# Patient Record
Sex: FEMALE | Race: WHITE | Employment: FULL TIME | URBAN - METROPOLITAN AREA
[De-identification: names, ages, dates, MRNs, and addresses within clinical notes are randomized per-mention and may not be internally consistent; named-entity substitution may affect disease eponyms.]

---

## 2017-01-26 PROBLEM — E66.01 MORBID OBESITY WITH BMI OF 40.0-44.9, ADULT (HCC): Status: ACTIVE | Noted: 2017-01-26

## 2017-01-26 PROCEDURE — 81001 URINALYSIS AUTO W/SCOPE: CPT | Performed by: INTERNAL MEDICINE

## 2017-01-26 PROCEDURE — 87086 URINE CULTURE/COLONY COUNT: CPT | Performed by: INTERNAL MEDICINE

## 2017-06-29 PROBLEM — H43.812 PVD (POSTERIOR VITREOUS DETACHMENT), LEFT: Status: ACTIVE | Noted: 2017-06-29

## 2017-06-29 PROBLEM — H35.462: Status: ACTIVE | Noted: 2017-06-29

## 2018-02-01 PROCEDURE — 87086 URINE CULTURE/COLONY COUNT: CPT | Performed by: INTERNAL MEDICINE

## 2018-02-01 PROCEDURE — 81001 URINALYSIS AUTO W/SCOPE: CPT | Performed by: INTERNAL MEDICINE

## 2018-02-04 PROBLEM — R31.29 MICROSCOPIC HEMATURIA: Status: ACTIVE | Noted: 2018-02-04

## 2019-03-14 PROCEDURE — 81001 URINALYSIS AUTO W/SCOPE: CPT | Performed by: INTERNAL MEDICINE

## 2019-03-14 PROCEDURE — 87086 URINE CULTURE/COLONY COUNT: CPT | Performed by: INTERNAL MEDICINE

## 2020-03-04 PROBLEM — H35.462: Status: RESOLVED | Noted: 2017-06-29 | Resolved: 2020-03-04

## 2020-03-04 PROBLEM — E66.01 MORBID OBESITY WITH BMI OF 40.0-44.9, ADULT (HCC): Status: RESOLVED | Noted: 2017-01-26 | Resolved: 2020-03-04

## 2020-03-04 PROBLEM — E66.01 OBESITY, MORBID (HCC): Status: ACTIVE | Noted: 2020-03-04

## 2024-06-05 ENCOUNTER — OFFICE VISIT (OUTPATIENT)
Dept: INTERNAL MEDICINE CLINIC | Facility: CLINIC | Age: 64
End: 2024-06-05
Payer: COMMERCIAL

## 2024-06-05 VITALS
SYSTOLIC BLOOD PRESSURE: 126 MMHG | OXYGEN SATURATION: 98 % | BODY MASS INDEX: 48.11 KG/M2 | DIASTOLIC BLOOD PRESSURE: 86 MMHG | WEIGHT: 281.81 LBS | HEART RATE: 80 BPM | HEIGHT: 64 IN

## 2024-06-05 DIAGNOSIS — E04.1 THYROID NODULE: ICD-10-CM

## 2024-06-05 DIAGNOSIS — E55.9 VITAMIN D DEFICIENCY: ICD-10-CM

## 2024-06-05 DIAGNOSIS — D22.9 MULTIPLE PIGMENTED NEVI: ICD-10-CM

## 2024-06-05 DIAGNOSIS — Z12.31 ENCOUNTER FOR SCREENING MAMMOGRAM FOR MALIGNANT NEOPLASM OF BREAST: ICD-10-CM

## 2024-06-05 DIAGNOSIS — F41.9 ANXIETY: ICD-10-CM

## 2024-06-05 DIAGNOSIS — E66.01 OBESITY, MORBID (HCC): ICD-10-CM

## 2024-06-05 DIAGNOSIS — Z13.6 SCREENING FOR HEART DISEASE: ICD-10-CM

## 2024-06-05 DIAGNOSIS — Z00.00 ANNUAL PHYSICAL EXAM: Primary | ICD-10-CM

## 2024-06-05 DIAGNOSIS — Z12.11 SCREENING FOR COLON CANCER: ICD-10-CM

## 2024-06-05 PROCEDURE — 99386 PREV VISIT NEW AGE 40-64: CPT | Performed by: INTERNAL MEDICINE

## 2024-06-05 RX ORDER — ALPRAZOLAM 0.5 MG/1
0.5 TABLET ORAL 3 TIMES DAILY PRN
Qty: 90 TABLET | Refills: 1 | Status: SHIPPED | OUTPATIENT
Start: 2024-06-05

## 2024-06-05 RX ORDER — PREDNISOLONE ACETATE 10 MG/ML
SUSPENSION/ DROPS OPHTHALMIC
COMMUNITY
Start: 2024-06-03

## 2024-06-05 RX ORDER — OFLOXACIN 3 MG/ML
SOLUTION/ DROPS OPHTHALMIC
COMMUNITY
Start: 2024-06-03

## 2024-06-05 RX ORDER — BROMFENAC SODIUM 0.81 MG/ML
SOLUTION/ DROPS OPHTHALMIC
COMMUNITY
Start: 2024-06-04

## 2024-06-05 NOTE — PROGRESS NOTES
Charmaine Bishop is a 64 year old female.    Chief complaint: annual physical exam       HPI:     Charmaine Bishop is a 64 year old pleasant female who presents for annual physical exam   Weight gain   Gained more weight with the loss of parents   Started gaining more weight in the 50's   High stress job   Now trying to loose weight   She joined weight [facility] in January   No interested in medications           Thyroid nodule   S/p FNA thyroid   Benign         Had the first panic attack 20 years ago   Doesn't take alprazolam every day   Xanax couple of times per month and sometime she can go months without it           Not sure if she snores at night   Doesn't sleep through the night   She gets 8 hours of sleep   Interrupted sleep   Nocturia               History of microscopic hematuria   S/p urological evaluation       No smoking   Alcohol social   Exercise : no she is going to be working on that       Family history of cancer:  Father: lung  cancer heavy smoker   Brother : ENT cancer heavy smoker and drinker         Current Outpatient Medications   Medication Sig Dispense Refill    Bromfenac Sodium 0.07 % Ophthalmic Solution       ofloxacin 0.3 % Ophthalmic Solution       prednisoLONE 1 % Ophthalmic Suspension       ALPRAZolam 0.5 MG Oral Tab Take 1 tablet (0.5 mg total) by mouth 3 (three) times daily as needed for Anxiety. 90 tablet 1    carvedilol 12.5 MG Oral Tab Take 1 tablet (12.5 mg total) by mouth 2 (two) times daily with meals. (Patient not taking: Reported on 6/4/2024) 180 tablet 3    ergocalciferol (DRISDOL) 1.25 MG (39723 UT) Oral Cap 1 CAPSULE EVERY WEEK (Patient not taking: Reported on 9/20/2021) 12 capsule 0      Past Medical History:    ANXIETY    Cataract    Cyst of pineal gland    Hiatal hernia    small; noted on CT urogram    HYPERLIPIDEMIA    Morbid obesity with BMI of 45.0-49.9, adult (HCC)    Posterior vitreous detachment    Screening for osteoporosis    normal    Thyroid nodule    Vitamin D  deficiency     Past Surgical History:   Procedure Laterality Date    Cyst aspiration right  2013    Other surgical history      wisdom teeth    Other surgical history  2012    FNA bx of thyroid nodule (per Dr. Ellis)    Other surgical history  03/19/2018    Cysto- Dr Oh             Family History   Problem Relation Age of Onset    Cancer Father         lung    Diabetes Father     Heart Disorder Father     Other (Other) Father     Cancer Brother         tongue    Other (alcoholic) Brother     Heart Disorder Paternal Grandfather     Eye Problems Neg      Patient Active Problem List   Diagnosis    Anxiety    Vitamin D deficiency    Pure hypercholesterolemia    Thyroid nodule    PVD (posterior vitreous detachment), left    Microscopic hematuria    Obesity, morbid (HCC)       REVIEW OF SYSTEMS:   A comprehensive 10 point review of systems was completed.  Pertinent positives and negatives noted in the the HPI            EXAM:   /86   Pulse 80   Ht 5' 4\" (1.626 m)   Wt 281 lb 12.8 oz (127.8 kg)   SpO2 98%   BMI 48.37 kg/m²   GENERAL: well developed, well nourished,in no apparent distress  SKIN: multiple pigmented nevi   HEENT: atraumatic, normocephalic,ears and throat are clear  NECK: supple,no adenopathy  LUNGS: clear to auscultation  Breast : normal   CARDIO: RRR without murmur  GI: no masses, HSM or tenderness  EXTREMITIES: no cyanosis, clubbing or edema  NEURO: no gross deficits              Orders Placed This Encounter    Bromfenac Sodium 0.07 % Ophthalmic Solution    ofloxacin 0.3 % Ophthalmic Solution    prednisoLONE 1 % Ophthalmic Suspension     No results found.         ASSESSMENT AND PLAN:       ICD-10-CM    1. Annual physical exam  Z00.00 OBG Referral - In Network      THYROID (CPT=76536)     Occult Blood, Fecal, Immunoassay (Blue cards) [E]     CBC With Differential With Platelet     Comp Metabolic Panel (14)     Lipid Panel     Hemoglobin A1C     TSH W Reflex To Free T4     VITAMIN D, SCREEN  [82159][Q]     UA/M WITH CULTURE REFLEX [3020][Q]     ALPRAZolam (XANAX) 0.5 MG Oral Tab     Derm Referral - In Network      2. Encounter for screening mammogram for malignant neoplasm of breast  Z12.31 [facility] IVAN 2D+3D SCREENING BILAT (CPT=77067/89843)     OBG Referral - In Network      THYROID (CPT=76536)     Occult Blood, Fecal, Immunoassay (Blue cards) [E]     CBC With Differential With Platelet     Comp Metabolic Panel (14)     Lipid Panel     Hemoglobin A1C     TSH W Reflex To Free T4     VITAMIN D, SCREEN [55543][Q]     UA/M WITH CULTURE REFLEX [3020][Q]     ALPRAZolam (XANAX) 0.5 MG Oral Tab     Derm Referral - In Network      3. Screening for colon cancer  Z12.11 Occult Blood, Fecal, Immunoassay (Blue cards) [E]     CBC With Differential With Platelet     Comp Metabolic Panel (14)     Lipid Panel     Hemoglobin A1C     TSH W Reflex To Free T4     VITAMIN D, SCREEN [99175][Q]     UA/M WITH CULTURE REFLEX [3020][Q]     ALPRAZolam (XANAX) 0.5 MG Oral Tab     Derm Referral - In Network      4. Vitamin D deficiency  E55.9 CBC With Differential With Platelet     Comp Metabolic Panel (14)     Lipid Panel     Hemoglobin A1C     TSH W Reflex To Free T4     VITAMIN D, SCREEN [22911][Q]     UA/M WITH CULTURE REFLEX [3020][Q]     ALPRAZolam (XANAX) 0.5 MG Oral Tab     Derm Referral - In Network      5. Screening for heart disease  Z13.6 CT CALCIUM SCORING     Derm Referral - In Network      6. Obesity, morbid (HCC)  E66.01       7. Thyroid nodule  E04.1 US THYROID (CPT=76536)      8. Anxiety  F41.9       9. Multiple pigmented nevi  D22.9          Diet and exercise   Self breast exam   Sun screen recommended   Fasting blood work   Referral to dermatology   Recommended CT calcium score   Doesn't want to go for a colonoscopy   Will do FIT   Mammogram screening   Referral to Ob gyne for pap smear   Can schedule a new weight loss visit if interested ( 40 min)   Recommended shingles vaccine : she will hold off today since  she has cataract surgery next week   Xanax refilled   US thyroid follow up   UA       Please return to the clinic if you are having persistent symptoms. If worsening symptoms should go to the ER    Bobby Dickens MD,   Diplomate of the American Board of Internal Medicine  Diplomate of the American Board of Obesity Medicine

## 2024-10-27 ENCOUNTER — IMMUNIZATION (OUTPATIENT)
Dept: FAMILY MEDICINE CLINIC | Facility: CLINIC | Age: 64
End: 2024-10-27
Payer: COMMERCIAL

## 2024-10-27 DIAGNOSIS — Z23 NEED FOR INFLUENZA VACCINATION: Primary | ICD-10-CM

## 2024-11-08 DIAGNOSIS — Z12.31 ENCOUNTER FOR SCREENING MAMMOGRAM FOR MALIGNANT NEOPLASM OF BREAST: ICD-10-CM

## 2024-11-08 DIAGNOSIS — E55.9 VITAMIN D DEFICIENCY: ICD-10-CM

## 2024-11-08 DIAGNOSIS — Z12.11 SCREENING FOR COLON CANCER: ICD-10-CM

## 2024-11-08 DIAGNOSIS — Z00.00 ANNUAL PHYSICAL EXAM: ICD-10-CM

## 2024-11-11 RX ORDER — ALPRAZOLAM 0.5 MG
0.5 TABLET ORAL 3 TIMES DAILY PRN
Qty: 90 TABLET | Refills: 1 | Status: SHIPPED | OUTPATIENT
Start: 2024-11-11

## 2024-11-11 NOTE — TELEPHONE ENCOUNTER
A refill request was received for:  Requested Prescriptions     Pending Prescriptions Disp Refills    ALPRAZolam (XANAX) 0.5 MG Oral Tab 90 tablet 1     Sig: Take 1 tablet (0.5 mg total) by mouth 3 (three) times daily as needed.     Last refill date:  6/5/24    Last office visit: 6/5/24      No future appointments.

## 2024-12-12 ENCOUNTER — APPOINTMENT (OUTPATIENT)
Dept: GENERAL RADIOLOGY | Age: 64
End: 2024-12-12
Attending: EMERGENCY MEDICINE
Payer: COMMERCIAL

## 2024-12-12 ENCOUNTER — APPOINTMENT (OUTPATIENT)
Dept: ULTRASOUND IMAGING | Age: 64
End: 2024-12-12
Attending: EMERGENCY MEDICINE
Payer: COMMERCIAL

## 2024-12-12 ENCOUNTER — HOSPITAL ENCOUNTER (OUTPATIENT)
Age: 64
Discharge: HOME | End: 2024-12-12
Attending: SURGERY
Payer: COMMERCIAL

## 2024-12-12 VITALS
OXYGEN SATURATION: 100 % | TEMPERATURE: 98 F | HEART RATE: 81 BPM | DIASTOLIC BLOOD PRESSURE: 95 MMHG | SYSTOLIC BLOOD PRESSURE: 150 MMHG | RESPIRATION RATE: 18 BRPM

## 2024-12-12 DIAGNOSIS — I10 HYPERTENSION, UNSPECIFIED TYPE: ICD-10-CM

## 2024-12-12 DIAGNOSIS — M17.11 OSTEOARTHRITIS OF RIGHT KNEE, UNSPECIFIED OSTEOARTHRITIS TYPE: Primary | ICD-10-CM

## 2024-12-12 PROCEDURE — 73562 X-RAY EXAM OF KNEE 3: CPT | Performed by: EMERGENCY MEDICINE

## 2024-12-12 PROCEDURE — 99214 OFFICE O/P EST MOD 30 MIN: CPT

## 2024-12-12 PROCEDURE — 93971 EXTREMITY STUDY: CPT | Performed by: EMERGENCY MEDICINE

## 2024-12-12 PROCEDURE — 99204 OFFICE O/P NEW MOD 45 MIN: CPT

## 2024-12-12 RX ORDER — PREDNISONE 20 MG/1
40 TABLET ORAL DAILY
Qty: 10 TABLET | Refills: 0 | Status: SHIPPED | OUTPATIENT
Start: 2024-12-12 | End: 2024-12-17

## 2024-12-12 NOTE — ED PROVIDER NOTES
Patient Seen in: [facility] [place]      History     Chief Complaint   Patient presents with    Knee Pain     Stated Complaint: Right knee swollen and pain    Subjective:   HPI      Patient is a 64-year-old female with no significant past medical history presents now with right knee pain and swelling.  Patient states that she has fallen in the past and injured the right knee, most recently at the beginning of this year.  However, over the last approximately 2 months, the patient states she has been developing worsening right knee pain and swelling.  Patient denies any recent trauma.  The patient denies any chest pain or shortness of breath.  Patient denies any left-sided knee pain.    Objective:     No pertinent past medical history.            No pertinent past surgical history.              No pertinent social history.            Review of Systems    Positive for stated complaint: Right knee swollen and pain  Other systems are as noted in HPI.  Constitutional and vital signs reviewed.      All other systems reviewed and negative except as noted above.    Physical Exam     ED Triage Vitals [12/12/24 1501]   BP (!) 163/85   Pulse 81   Resp 18   Temp 97.8 °F (36.6 °C)   Temp src Oral   SpO2 100 %   O2 Device None (Room air)       Current Vitals:   Vital Signs  BP: (!) 163/85  Pulse: 81  Resp: 18  Temp: 97.8 °F (36.6 °C)  Temp src: Oral    Oxygen Therapy  SpO2: 100 %  O2 Device: None (Room air)        Physical Exam    Constitutional: Well-developed well-nourished in no acute distress  Head: Normocephalic, no swelling or tenderness  Eyes: Nonicteric sclera, no conjunctival injection  Vascular: Palpable right posterior tibial pulse  Neurologic: Patient is awake, alert and oriented ×3.  The patient's motor strength is 5 out of 5 and symmetric in the upper and lower extremities bilaterally  Extremities: There is moderate swelling and tenderness to the anterior aspect of the right knee.  There is also fullness  inferior to the knee itself along the anterior aspect of the proximal tibia.  There is similar swelling of the anterior aspect of the left knee as well but more pronounced on the right.  Skin: No pallor, no redness or warmth to the touch      ED Course   Labs Reviewed - No data to display     Patient's x-rays were independently reviewed by myself.  There is no acute fracture or dislocation.  There is mild degenerative narrowing medial joint space.  There is prominent soft tissue of the proximal tibia anteriorly  Patient's ultrasound images and the radiologist report demonstrating no evidence of DVT, no abnormal fluid collection or mass noted in the area palpable fullness along the anterior tibial area    Patient's x-ray results and ultrasound report were discussed with her.  Patient's elevated blood pressure was discussed.  The patient states that her blood pressure is typically normal when she is at her primary MD's office.  Patient is having some pain.  The patient states that she has been taking Aleve with minimal improvement.  The risk and benefits of steroids were discussed.  The patient is agreeable to short course of prednisone.  Recommend orthopedic follow-up.  Provided patient with multiple orthopedic groups to expedite follow-up.       MDM      Knee contusion versus effusion versus bursitis versus DVT        Medical Decision Making      Disposition and Plan     Clinical Impression:  1. Osteoarthritis of right knee, unspecified osteoarthritis type    2. Hypertension, unspecified type         Disposition:  Discharge  12/12/2024  3:52 pm    Follow-up:  Malena Isaacs MD  [address]  [place] [id number] [phone]      Call for an appointment    Arnoldo Alexander MD  [address] [facility] [address]  [place] [id number] [phone]      Call for an appointment    Bobby Dickens MD  [address] [facility] [id number] [phone]      Call for an appointment to recheck blood  pressure.          Medications Prescribed:  Current Discharge Medication List        START taking these medications    Details   predniSONE 20 MG Oral Tab Take 2 tablets (40 mg total) by mouth daily for 5 days.  Qty: 10 tablet, Refills: 0                 Supplementary Documentation:

## 2024-12-12 NOTE — DISCHARGE INSTRUCTIONS
Prednisone as prescribed.  Follow-up with orthopedics, you can call both Dr. Isaacs's and Dr. Alexander's office to see who could see you sooner.

## 2024-12-13 ENCOUNTER — TELEPHONE (OUTPATIENT)
Facility: CLINIC | Age: 64
End: 2024-12-13

## 2024-12-13 DIAGNOSIS — M25.562 PAIN IN BOTH KNEES, UNSPECIFIED CHRONICITY: Primary | ICD-10-CM

## 2024-12-13 DIAGNOSIS — M25.561 PAIN IN BOTH KNEES, UNSPECIFIED CHRONICITY: Primary | ICD-10-CM

## 2024-12-13 NOTE — TELEPHONE ENCOUNTER
Pt scheduled new appt bilateral knee pain more on rt imaging in epic  Future Appointments  12/20/2024 9:00 AM    Criss Peace PA-C         [facility]        [facility]  6/9/2025   8:20 AM    Bobby Dickens MD [facility] [address]

## 2024-12-16 ENCOUNTER — HOSPITAL ENCOUNTER (OUTPATIENT)
Dept: GENERAL RADIOLOGY | Age: 64
Discharge: HOME | End: 2024-12-16
Payer: COMMERCIAL

## 2024-12-16 DIAGNOSIS — M25.562 PAIN IN BOTH KNEES, UNSPECIFIED CHRONICITY: ICD-10-CM

## 2024-12-16 DIAGNOSIS — M25.561 PAIN IN BOTH KNEES, UNSPECIFIED CHRONICITY: ICD-10-CM

## 2024-12-16 PROCEDURE — 73564 X-RAY EXAM KNEE 4 OR MORE: CPT

## 2024-12-20 ENCOUNTER — OFFICE VISIT (OUTPATIENT)
Dept: ORTHOPEDICS CLINIC | Facility: CLINIC | Age: 64
End: 2024-12-20
Payer: COMMERCIAL

## 2024-12-20 VITALS — BODY MASS INDEX: 50.02 KG/M2 | HEIGHT: 64 IN | WEIGHT: 293 LBS

## 2024-12-20 DIAGNOSIS — M17.11 PRIMARY OSTEOARTHRITIS OF RIGHT KNEE: Primary | ICD-10-CM

## 2024-12-20 PROCEDURE — 99203 OFFICE O/P NEW LOW 30 MIN: CPT

## 2024-12-20 RX ORDER — MELOXICAM 15 MG/1
15 TABLET ORAL DAILY
Qty: 30 TABLET | Refills: 0 | Status: SHIPPED | OUTPATIENT
Start: 2024-12-20

## 2024-12-20 NOTE — PROGRESS NOTES
[facility] New Patient Note    CC: Right knee pain  Chief Complaint   Patient presents with    Knee Pain     Right knee pain started to worsen in oct  Patient notes no pain in left knee   Patient notes she fell at the beginning of the year it got better but has returned   Patient was seen in the [facility] and she was given a steroid course which helped with swelling but since has been using aleve as needed        HPI: This 64 year old female presents today with complaints of right knee pain. She has been dealing with right knee pain since October when she returned from a trip to [place] where she did a lot of walking in the sand.  Prior to this, she did not experience much pain in either the knees.  No known injury or trauma to the right lower extremity.  No prior surgeries to the right knee.  She describes it as an achy, throbbing, and intermittent pain about the anterior aspect of the knee.  The pain seems to be worse after walking for prolonged periods of time, standing from a seated position, after sitting for prolonged periods of time, and if she has to get out of bed at night to go to the bathroom.  She denies any numbness, tingling, or radiating pain above or below the knee or into the toes.  The knee pain was severe that she went to the urgent care on 12/12/2024 where she was given a dose of oral steroids which she finished on Tuesday, 12/17/2024. Reports this seemed to help with the pain and inflammation some.  She has also been taking over-the-counter Aleve which seems to take the edge off.  She is here today for further evaluation.    Past Medical History:    ANXIETY    Cataract    Cyst of pineal gland    Hiatal hernia    small; noted on CT urogram    HYPERLIPIDEMIA    Morbid obesity with BMI of 45.0-49.9, adult (HCC)    Posterior vitreous detachment    Screening for osteoporosis    normal    Thyroid nodule    Vitamin D deficiency     Past Surgical History:   Procedure Laterality Date     Cataract      Cyst aspiration right  2013    Other surgical history      wisdom teeth    Other surgical history  2012    FNA bx of thyroid nodule (per Dr. Ellis)    Other surgical history  03/19/2018    Cysto- Dr Oh     Current Outpatient Medications   Medication Sig Dispense Refill    ALPRAZolam (XANAX) 0.5 MG Oral Tab Take 1 tablet (0.5 mg total) by mouth 3 (three) times daily as needed. 90 tablet 1    Bromfenac Sodium 0.07 % Ophthalmic Solution       ofloxacin 0.3 % Ophthalmic Solution       prednisoLONE 1 % Ophthalmic Suspension       ALPRAZolam 0.5 MG Oral Tab Take 1 tablet (0.5 mg total) by mouth 3 (three) times daily as needed for Anxiety. 90 tablet 1    carvedilol 12.5 MG Oral Tab Take 1 tablet (12.5 mg total) by mouth 2 (two) times daily with meals. 180 tablet 3    ergocalciferol (DRISDOL) 1.25 MG (57341 UT) Oral Cap 1 CAPSULE EVERY WEEK 12 capsule 0     Allergies[1]  Family History   Problem Relation Age of Onset    Cancer Father         lung    Diabetes Father     Heart Disorder Father     Other (Other) Father     Cancer Brother         tongue    Other (alcoholic) Brother     Heart Disorder Paternal Grandfather     Eye Problems Neg      Social History     Occupational History    Occupation: computer work   Tobacco Use    Smoking status: Never    Smokeless tobacco: Never    Tobacco comments:     caffeine: 2 pops/d     Updated 12/19/24   Vaping Use    Vaping status: Never Used   Substance and Sexual Activity    Alcohol use: Yes     Alcohol/week: 1.0 standard drink of alcohol     Types: 1 Standard drinks or equivalent per week     Comment: social    Drug use: No    Sexual activity: Yes     Partners: Male        ROS:  Comprehensive system review obtained and negative except as mentioned above    Physical Exam:    Ht 5' 4\" (1.626 m)   Wt 293 lb (132.9 kg)   BMI 50.29 kg/m²   Constitutional: Awake, alert, no distress.  Very pleasant and conversational  Psychological: Appropriate affect.  Respiratory:  Unlabored breathing.  Right lower extremity:  Inspection: skin is intact without any redness or deformity.  Trace effusion.   Palpation: Moderate tenderness to palpation about the medial joint line.  No tenderness palpation about the lateral joint line.  No tenderness to palpation about the superior pole of the patella.  Mild tenderness palpation about the inferior pole the patella.  No tenderness palpation about the distal quadriceps tendon.  Range of motion: Knee can extend to 10 degrees short of full and flex to approximately 110 degrees.  Knee is stable to valgus and varus stress at 0 and 30 degrees. No laxity with anterior or posterior drawer.  Negative Alfredo test.  No calf pain or tenderness.  Negative Homans' sign.  Neuromuscular: Strength is normal and sensation is intact.  Vascular: Extremities are warm and well-perfused.  Lymph: Unremarkable.    Imaging: Imaging was personally viewed, independently interpreted and radiology report read. They show:  XR KNEE, COMPLETE (4 OR MORE VIEWS), RIGHT (CPT=73564)    Result Date: 12/17/2024  CONCLUSION: Moderate to severe symmetric degenerative changes within both knees.    Dictated by (CST): Floyd Navarro MD on 12/17/2024 at 12:23 PM     Finalized by (CST): Floyd Navarro MD on 12/17/2024 at 12:25 PM          XR KNEE, COMPLETE (4 OR MORE VIEWS), LEFT (CPT=73564)    Result Date: 12/17/2024  CONCLUSION: Moderate to severe symmetric degenerative changes within both knees.    Dictated by (CST): Floyd Navarro MD on 12/17/2024 at 12:21 PM     Finalized by (CST): Floyd Navarro MD on 12/17/2024 at 12:23 PM          US VENOUS DOPPLER LEG RIGHT - DIAG IMG (CPT=93971)    Result Date: 12/12/2024  CONCLUSION: No sonographic evidence of right lower extremity DVT.  Please note that ultrasound was performed in the region of the patient's palpable abnormality.  There is no underlying fluid collection, mass, or other abnormality.    Dictated by (CST): Floyd Navarro MD  on 12/12/2024 at 3:38 PM     Finalized by (CST): Floyd Navarro MD on 12/12/2024 at 3:39 PM          XR KNEE (3 VIEWS), RIGHT (CPT=73562)    Result Date: 12/12/2024  CONCLUSION:  1. No acute appearing fracture or dislocation.  Mild degenerative narrowing of the medial joint space medial femoral joint.  Prominent soft tissue at the proximal tibia anteriorly.  Correlate clinically.    Dictated by (CST): Tutu Diaz MD on 12/12/2024 at 3:32 PM     Finalized by (CST): Tutu Diaz MD on 12/12/2024 at 3:33 PM            Assessment/Plan:  Assessment: 64-year-old female with moderate primary osteoarthritis of the right knee    Plan: I discussed the etiology, natural history, and management options for symptomatic knee osteoarthritis.  I discussed nonsurgical and surgical treatments, with nonsurgical treatments to include anti-inflammatory medications, injections, activity modification, weight loss, low impact exercise and possible therapy.  Surgery would be with knee replacement and is an elective operation reserved for when nonsurgical treatments no longer alleviate symptoms sufficiently.  She has been needing to take 3 Aleve daily to deal with the pain and does not want to continue taking this amount of medication each day, which is certainly reasonable. She is interested in a prescription oral anti-inflammatory and meloxicam was sent to the patient's pharmacy.  I advised her not to take any other over-the-counter anti-inflammatories will taking the meloxicam.  She may alternate Tylenol as needed to help with pain and inflammation. She is taking over the Tumeric and will add Glucosamine supplements as well as she would like to continue with natural options too. She is going to start participating in water aerobics and stationary cycling to see if this improves her symptoms and also help with weight loss.  In the future, we discussed the possibility of steroid versus HA gel injections if her symptoms are not  improving or worsen. She will follow-up in 6 weeks or sooner with any worsening symptoms. All questions and concerns were addressed and answered to the patient's satisfaction. They are in agreement with the treatment plan going forward.     KATIE Stringer, PAJordanC  Orthopedic Surgery   t: [phone]  f: [phone]           This document was partially prepared using Dragon Medical voice recognition software.  Although every attempt is made to correct errors during dictation, discrepancies may still exist. Please contact me with any questions or clarifications.         [1]   Allergies  Allergen Reactions    Amoxil HIVES    Sulfa Antibiotics RASH

## 2025-01-03 ENCOUNTER — TELEPHONE (OUTPATIENT)
Dept: ORTHOPEDICS CLINIC | Facility: CLINIC | Age: 65
End: 2025-01-03

## 2025-01-03 ENCOUNTER — OFFICE VISIT (OUTPATIENT)
Dept: ORTHOPEDICS CLINIC | Facility: CLINIC | Age: 65
End: 2025-01-03
Payer: COMMERCIAL

## 2025-01-03 VITALS — HEIGHT: 64 IN | WEIGHT: 293 LBS | BODY MASS INDEX: 50.02 KG/M2

## 2025-01-03 DIAGNOSIS — M17.11 PRIMARY OSTEOARTHRITIS OF RIGHT KNEE: Primary | ICD-10-CM

## 2025-01-03 PROCEDURE — 99213 OFFICE O/P EST LOW 20 MIN: CPT

## 2025-01-03 PROCEDURE — 20610 DRAIN/INJ JOINT/BURSA W/O US: CPT

## 2025-01-03 RX ORDER — TRIAMCINOLONE ACETONIDE 40 MG/ML
40 INJECTION, SUSPENSION INTRA-ARTICULAR; INTRAMUSCULAR ONCE
Status: COMPLETED | OUTPATIENT
Start: 2025-01-03 | End: 2025-01-03

## 2025-01-03 RX ADMIN — TRIAMCINOLONE ACETONIDE 40 MG: 40 INJECTION, SUSPENSION INTRA-ARTICULAR; INTRAMUSCULAR at 08:30:00

## 2025-01-03 NOTE — PROCEDURES
Risks and benefits of knee injection discussed with the patient, with risks including but not limited to pain and swelling at the injection site and/or within the knee joint, infection, elevation in blood pressure and/or glucose levels, facial flushing. After informed consent, the patient's right knee was marked, locally anesthetized with skin refrigerant, prepped with topical antiseptic, and injected with a mixture of 1mL 40mg/mL Kenalog, 2mL 1% lidocaine and 2mL 0.5% marcaine through the inferolateral portal.  A band-aid was applied.  The patient tolerated the procedure well.      KATIE Stringer, PAJordanC  Orthopedic Surgery   t: [phone]  f: [phone]           This document was partially prepared using Dragon Medical voice recognition software.  Although every attempt is made to correct errors during dictation, discrepancies may still exist. Please contact me with any questions or clarifications.

## 2025-01-03 NOTE — PROGRESS NOTES
[facility] Progress Note      Chief Complaint:  right knee follow up      Subjective: This 64-year-old female presents today for follow-up on her right knee pain.  She was last seen by myself in clinic on 12/20/2024 for osteoarthritis of the right knee.  She reports the pain has not been getting any better and started worsening on [place].  No new known injury or falls to the right lower extremity.  The pain is made worse by getting out of the car, walking, standing from a seated position, and when getting out of bed at night.  Pain is limited her ability to complete her activities of daily living and she has been unable to go to the grocery store due to the pain.  She tried taking the prescription meloxicam which did not provide any relief in symptoms.  Icing seems to help some.  She is posted in a steroid injection into the right knee and is here today for further evaluation.    Objective: Very pleasant 64-year-old female who does not appear in any acute distress.  Alert and oriented to person, place, and time.  Appropriate affect.  Nonlabored breathing.  Exam of the right knee and lower extremity reveals overlying skin is intact.  No ecchymosis or erythema.  Moderate swelling noted.  She has tenderness palpation about the medial joint line.  No tenderness palpation about the lateral joint line.  Tenderness to palpation over the patella.  Negative Alfredo test.  No calf pain or tenderness.  Negative Homans' sign.  Sensation is present to light touch.      Assessment: 64-year-old female with moderate primary osteoarthritis of the right knee      Plan: We had a lengthy discussion about continued conservative treatment options for managing knee osteoarthritis.  As the meloxicam has not provided any relief in symptoms, she is interested in a corticosteroid injection into the right knee which is reasonable and we also discussed at our last visit.  She would like to move forward with this at today's visit.   Please see separate procedure note for additional details.  She also inquired about the possibility of HA injections which I feel she would be a good candidate for based on her radiographs, clinical symptoms, and lack of improvement from oral anti-inflammatories.  Prior authorization order for gel injection was placed at today's visit.  I also recommended participating in formal physical therapy to help with stretching and strengthening exercises which she is motivated to participate in.  An internal prescription for physical therapy was placed at today's visit and she will call to schedule soon.  I informed her that our office will call her once authorization is completed and the gel injection has arrived in our office and she may schedule an appointment with me at that time for the HA injection.  She will follow-up sooner with any worsening symptoms or concerns.  All questions and concerns were addressed and answered to the patient satisfaction.  She is in agreement with treatment going forward.      Criss Peace [facility], PA-C  Orthopedic Surgery   [address] [place] [id number]   t: [phone]  f: [phone]         This document was partially prepared using Dragon Medical voice recognition software.  Although every attempt is made to correct errors during dictation, discrepancies may still exist. Please contact me with any questions or clarifications.

## 2025-01-08 ENCOUNTER — PATIENT MESSAGE (OUTPATIENT)
Dept: ORTHOPEDICS CLINIC | Facility: CLINIC | Age: 65
End: 2025-01-08

## 2025-01-08 NOTE — TELEPHONE ENCOUNTER
[facility]OV 1/3/25, OA right knee    Requesting different medication for knee pain.    Swelling down.  Using cane.  Still with difficulty ambulating or \"doing normal life things.\"    Has tried/failed meloxicam (still on medication list)  Tried/failed OTC Aleve - has taken \"3 aleve every few days\"  Not taking tylenol or other medications for pain.  Has been icing, elevating. Avoids prolonged walking and prolonged standing.  Decreased inflammation with icing.

## 2025-01-09 NOTE — TELEPHONE ENCOUNTER
Hi!    Can you please let the patient know a few things:    1) Steroid injections can take up to a week, sometimes 7-10 days to reach full effect, so I would give it a few more days to see if it starts to kick in more. It is reassuring that the inflammation has started to go down, which should subsequently help with the pain.     2) I did place an order for the Gel injection too, so as soon as it is approved and arrived at our office, they will give her a call to set up an appointment. She may benefit from the visco injections more.     3) I also placed an order for formal physical therapy at our [facility], which she was very interested in. I would encourage calling to schedule those appointments as soon as possible as that should also help with the pain and provide more relief    4) Please advise she may take 2 Aleve in the morning and 1 at night for up to 7 days. She may alternate Tylenol 1,000mg every 8 hours as needed. Do not exceed the dosing guidelines.

## 2025-01-09 NOTE — TELEPHONE ENCOUNTER
Patient authorized visco to be scheduled:    DOS:1/31/25  PROVIDER: alberto   MEDICATION:durolane   OFFICE LOCATION: [place]   PULLED: needs to be pulled   LABELED: needs to be labeled  PLACED: needs to be placed

## 2025-01-21 ENCOUNTER — TELEPHONE (OUTPATIENT)
Dept: PHYSICAL THERAPY | Age: 65
End: 2025-01-21

## 2025-01-22 ENCOUNTER — TELEPHONE (OUTPATIENT)
Dept: PHYSICAL THERAPY | Facility: HOSPITAL | Age: 65
End: 2025-01-22

## 2025-01-24 ENCOUNTER — OFFICE VISIT (OUTPATIENT)
Dept: PHYSICAL THERAPY | Age: 65
End: 2025-01-24
Payer: COMMERCIAL

## 2025-01-24 DIAGNOSIS — M17.11 PRIMARY OSTEOARTHRITIS OF RIGHT KNEE: Primary | ICD-10-CM

## 2025-01-24 PROCEDURE — 97162 PT EVAL MOD COMPLEX 30 MIN: CPT | Performed by: PHYSICAL THERAPIST

## 2025-01-24 PROCEDURE — 97110 THERAPEUTIC EXERCISES: CPT | Performed by: PHYSICAL THERAPIST

## 2025-01-24 NOTE — PROGRESS NOTES
LOWER EXTREMITY EVALUATION:     Diagnosis:   Primary osteoarthritis of right knee (M17.11) Patient:  Charmaine Bishop         Referring Provider: Criss Peace PA-C Today's Date   1/24/2025    Precautions:  Fall Risk   Date of Evaluation: 01/24/25  Next MD visit: NA  Date of Surgery: NA     PATIENT SUMMARY   Summary of chief complaints: Main complaint is stiffness at the knee and pain  History of current condition: States fell down 7 steps at home early 2024 and after that is when knee started bothering her after sitting in car.States this pain at R knee worsened  in October of 2024- went on vacation and did alot of walking and on sand.  When returned the R knee started to be painful.  After 2months did get steroids which didn't help, tried meloxicam didn't help.  Had a cortisone shot at end of December and it has helped.  Getting a gel injection next Friday.   Pain level: current  , at best 0 /10, at worst 3 /10  Description of symptoms: stiffness at knee, pain at the knee   Occupation: HR   Leisure activities/Hobbies:     Prior level of function: prior to October no limitations in daily activities, only thing she noticed was some pain if driving in the car. This started after falling down the stairs  Current limitations: States walking > 5 min pain increases, painful up/down stairs, limited standing time,  Pt goals:  Able to return to walking and moving around with minimal to no knee pain.  Able to get up from floor on her own  Past medical history was reviewed with Charmaine.  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  She  has a past surgical history that includes cyst  aspiration right (2013); other surgical history; other surgical history (2012); other surgical history (03/19/2018); and cataract.    ASSESSMENT  Charmaine presents to physical therapy evaluation with primary c/o Main complaint is stiffness at the knee and pain. The results of the objective tests and measures show impaired ROM R to L knee, edema noted R to L knee, antalgic gait pattern with lateral lean to R, impaired strength R to L LE and c/o pain  . Functional deficits include but are not limited to States walking > 5 min pain increases, painful up/down stairs, limited standing time,. Signs and symptoms are consistent with diagnosis of Primary osteoarthritis of right knee (M17.11). Pt and PT discussed evaluation findings, pathology, POC and HEP.  Pt voiced understanding and performs HEP correctly without reported pain. Skilled Physical Therapy is medically necessary to address the above impairments and reach functional goals.    OBJECTIVE:   Musculoskeletal  Observation: noted swelling at knee and then also upper medial lower leg area       DIANA ROM WNL and Strength (5/5) except below:  (* denotes performed with pain)  Hip   ROM MMT (-/5)    R L R L     Flex (L2)     5/5 5/5     Ext  WNL WNL 4 minus/5 5/5     Abd WNL WNL 4 minus/5 4+/5     ER WNL WNL  4+  4+     IR WNL WNL  4+  4+     ,   Knee   ROM MMT (-/5)    R L R L     Flex 110 122 4/5 5/5     Ext (L3) +2 0 4/5 5/5           Neurological:  Sensation: intact      Balance and Functional Mobility:  Gait: pt ambulates on level ground with trendelenburg/waddle   Balance: SLS: R unable to perform due to pain ,  L NT   Functional Mobility:  5x sit to stand test: unable to perform    TUG:  NT     Today's Treatment and Response:   Pt education was provided on exam findings, treatment diagnosis, treatment plan, expectations, and prognosis.  Today's Treatment       1/24/2025   Treatment   Therapeutic Exercise Min 45   Total of Timed Procedures 45   Total of Service Based 0    Total Treatment Time 45         Patient was instructed in and issued a HEP for: Heel slides   SLR supine  Side glut med  Standing heel raises  Seated heel slides  Instruction in use of cane when walking to reduce stress to knee  Discussed reducing stairs due to pain produced at R knee.    Charges:  PT EVAL: Moderate Complexity,    In agreement with evaluation findings and clinical rationale, this evaluation involved MODERATE COMPLEXITY decision making due to 1-2 personal factors/comorbidities, 3 or more body structures involved/activity limitations, and evolving symptoms as documented in the evaluation.                                                                                                                PLAN OF CARE:    Goals: (to be met in 12 visits)   Goals       Therapy Goals     Patient to be independent with HPE  Patient to have equal ROM R to L knee  Patient to be able to walk with decrease lateral lean to R  Patient to be able to go up/down stairs with minimal pain R knee.   Patient to use walker for next few weeks and decrease stairs to reduce strain at R knee.  Patient to report 90% improvement from initial session.              Frequency / Duration: Patient will be seen 1-2x/week or a total of 12  visits over a 90 day period. Treatment will include: Gait training; Therapeutic Exercise; Home Exercise Program instruction    Education or treatment limitation: None   Rehab Potential: good To fair    LEFS Score  LEFS Score: (Patient-Rptd) 32.5 % (1/18/2025  9:39 AM)      Patient was advised of these findings, precautions, and treatment options and has agreed to actively participate in planning and for this course of care.    Thank you for your referral. Please co-sign or sign and return this letter via fax as soon as possible to [phone]. If you have any questions, please contact me at Dept: [phone]    Sincerely,  Electronically signed by therapist: Sarah Dotson, PT  Physician's  certification required: Yes  I certify the need for these services furnished under this plan of treatment and while under my care.    X___________________________________________________ Date____________________    Certification From: 1/24/2025  To:4/24/2025

## 2025-01-30 ENCOUNTER — OFFICE VISIT (OUTPATIENT)
Dept: PHYSICAL THERAPY | Age: 65
End: 2025-01-30
Payer: COMMERCIAL

## 2025-01-30 PROCEDURE — 97110 THERAPEUTIC EXERCISES: CPT | Performed by: PHYSICAL THERAPIST

## 2025-01-30 NOTE — PROGRESS NOTES
Patient: Charmaine Bishop  Referring Provider:  Insurance:   Diagnosis: Primary osteoarthritis of right knee (M17.11) Criss Peace  [employer]   Date of Surgery: NA Next MD visit:  N/A   Precautions:  Fall Risk NA Referral Information:    Date of Evaluation: Req: 0, Auth: 0, Exp:     01/24/25 POC Auth Visits:  12       Today's Date   1/30/2025    Subjective  Patient states she has been using the cane and not doing stairs and the knee is feeling less painful.  Gets gel injections tomorrow.       Pain: 2/10     Objective  see outpatient daily record                Assessment   Patient has made very good progress with ROM at R knee from initial session improving from 110-118.  Also gait much improved without use of cane, minimal lateral lean noted.  Patient to continue use of cane and not doing stairs with R LE for one additional week, will then progress as able.    Goals (to be met in 12 visits)   Goals       Therapy Goals     Patient to be independent with HPE  Patient to have equal ROM R to L knee  Patient to be able to walk with decrease lateral lean to R  Patient to be able to go up/down stairs with minimal pain R knee.   Patient to use walker for next few weeks and decrease stairs to reduce strain at R knee.  Patient to report 90% improvement from initial session.              Plan  Continue work on ROM, strengthening, gait, balance , advance as able.    Treatment Last 4 Visits       1/24/2025 1/30/2025   Treatment   Treatment Day  2   Therapeutic Exercise  Nu step level 4, x 5 minutes.   Heel slides on board x 12 with use of strap  SLR supine 2 x 10  Side glut med 2 x 10  SAQ 2 x 10  Heel slides x 10 with strap on board  Standing heel raises 2 x 10  Slant board stretch x 45 seconds  Step up forward x 5 on 3\" step - no pain-use of support bar  March on air ex x 10 reps - use of support bar  Seated heel slides x 10   Therapeutic Exercise Min 45    Total of Timed Procedures 45 0   Total of Service Based 0 0   Total  Treatment Time 45 0         HEP  No changes      Charges           Ex 3  40 min

## 2025-01-31 ENCOUNTER — OFFICE VISIT (OUTPATIENT)
Dept: ORTHOPEDICS CLINIC | Facility: CLINIC | Age: 65
End: 2025-01-31
Payer: COMMERCIAL

## 2025-01-31 ENCOUNTER — APPOINTMENT (OUTPATIENT)
Dept: PHYSICAL THERAPY | Age: 65
End: 2025-01-31
Payer: COMMERCIAL

## 2025-01-31 VITALS — BODY MASS INDEX: 49.17 KG/M2 | HEIGHT: 64 IN | WEIGHT: 288 LBS

## 2025-01-31 DIAGNOSIS — M17.11 PRIMARY OSTEOARTHRITIS OF RIGHT KNEE: Primary | ICD-10-CM

## 2025-01-31 PROCEDURE — 20610 DRAIN/INJ JOINT/BURSA W/O US: CPT

## 2025-01-31 NOTE — PROCEDURES
Risks and benefits of knee injection discussed with the patient, with risks including but not limited to pain and swelling at the injection site and/or within the knee joint, infection, elevation in blood pressure and/or glucose levels, facial flushing. After informed consent, the patient's right knee was marked, locally anesthetized with skin refrigerant, prepped with topical antiseptic, and injected with 3mL of 60mg/3mL Durolane through the inferolateral portal.  A band-aid was applied.  The patient tolerated the procedure well.      KATIE Stringer, PA-C  Orthopedic Surgery   t: [phone]  f: [phone]           This document was partially prepared using Dragon Medical voice recognition software.  Although every attempt is made to correct errors during dictation, discrepancies may still exist. Please contact me with any questions or clarifications.

## 2025-02-04 ENCOUNTER — OFFICE VISIT (OUTPATIENT)
Dept: PHYSICAL THERAPY | Age: 65
End: 2025-02-04
Payer: COMMERCIAL

## 2025-02-04 PROCEDURE — 97110 THERAPEUTIC EXERCISES: CPT | Performed by: PHYSICAL THERAPIST

## 2025-02-04 NOTE — PROGRESS NOTES
Patient: Charmaine Bishop  Referring Provider:  Insurance:   Diagnosis: Primary osteoarthritis of right knee (M17.11) Criss Peace  [employer]   Date of Surgery: NA Next MD visit:  N/A   Precautions:  Fall Risk NA Referral Information:    Date of Evaluation: Req: 0, Auth: 0, Exp:     01/24/25 POC Auth Visits:  12       Today's Date   2/4/2025    Subjective  Patient reports had gel injection at knee.  Md said to see how it goes for next 8 weeks, then see her again.  Reports pain is mild today       Pain: 1/10     Objective  see outpatient daily record              Assessment   Added additional exercises this session and increased step to 4\" without difficulty by patient.      Goals (to be met in 12 visits)   Goals       Therapy Goals     Patient to be independent with HPE  Patient to have equal ROM R to L knee  Patient to be able to walk with decrease lateral lean to R  Patient to be able to go up/down stairs with minimal pain R knee.   Patient to use walker for next few weeks and decrease stairs to reduce strain at R knee.  Patient to report 90% improvement from initial session.              Plan  Continue work on ROM, strengthening, gait, balance , advance as able.    Treatment Last 4 Visits       1/24/2025 1/30/2025 2/4/2025   PT Treatment   Treatment Day  2 3   Therapeutic Exercise  Nu step level 4, x 5 minutes.   Heel slides on board x 12 with use of strap  SLR supine 2 x 10  Side glut med 2 x 10  SAQ 2 x 10  Heel slides x 10 with strap on board  Standing heel raises 2 x 10  Slant board stretch x 45 seconds  Step up forward x 5 on 3\" step - no pain-use of support bar  March on air ex x 10 reps - use of support bar  Seated heel slides x 10 Nu step level 6, x 6 minutes.   Heel slides on board x 15 with use of strap  SLR supine 2 x 10  Side glut med 2 x 10 RTB  SAQ 2 x 12  Heel slides x 10 with strap on board  HS curls with RTB 2 x 12  Standing heel raises 2 x 10  Slant board stretch x 45 seconds  Step up forward x 10 on  4\" step - no pain-use of support bar  March on air ex x 30 reps- use of support bar  Wobble board a/p, s/s x 10 with support bar  Seated heel slides x 10   Therapeutic Exercise Min 45  40   Total of Timed Procedures 45 0 40   Total of Service Based 0 0 0   Total Treatment Time 45 0 40         HEP  No changes      Charges           Ex 3   40 minutes

## 2025-02-07 ENCOUNTER — APPOINTMENT (OUTPATIENT)
Dept: PHYSICAL THERAPY | Age: 65
End: 2025-02-07
Payer: COMMERCIAL

## 2025-02-11 ENCOUNTER — OFFICE VISIT (OUTPATIENT)
Dept: PHYSICAL THERAPY | Age: 65
End: 2025-02-11
Payer: COMMERCIAL

## 2025-02-11 PROCEDURE — 97110 THERAPEUTIC EXERCISES: CPT | Performed by: PHYSICAL THERAPIST

## 2025-02-11 NOTE — PROGRESS NOTES
Patient: Charmaine Bishop  Referring Provider:  Insurance:   Diagnosis: Primary osteoarthritis of right knee (M17.11) Criss CARBAJAL   Date of Surgery: NA Next MD visit:  N/A   Precautions:  Fall Risk NA Referral Information:    Date of Evaluation: Req: 0, Auth: 0, Exp:     01/24/25 POC Auth Visits:  12       Today's Date   2/11/2025    Subjective  Patient reports the knee has some light pain today.  Has been doing a lot of walking and moving and pain has not increased.        Pain: 1/10     Objective  see outpatient daily record            Assessment  Continued work for ROM and LE strengthening.  Patient able to increase to 5\" step today without difficulty or pain. Added tandem stand for balance on floor , will advance to air ex as limited difficulty on floor.    Goals (to be met in 12 visits)   Goals       Therapy Goals     Patient to be independent with HPE  Patient to have equal ROM R to L knee  Patient to be able to walk with decrease lateral lean to R  Patient to be able to go up/down stairs with minimal pain R knee.   Patient to use walker for next few weeks and decrease stairs to reduce strain at R knee.  Patient to report 90% improvement from initial session.              Plan  Continue work on ROM, strengthening, gait, balance , advance as able.    Treatment Last 4 Visits       1/24/2025 1/30/2025 2/4/2025 2/11/2025   PT Treatment   Treatment Day  2 3 4   Therapeutic Exercise  Nu step level 4, x 5 minutes.   Heel slides on board x 12 with use of strap  SLR supine 2 x 10  Side glut med 2 x 10  SAQ 2 x 10  Heel slides x 10 with strap on board  Standing heel raises 2 x 10  Slant board stretch x 45 seconds  Step up forward x 5 on 3\" step - no pain-use of support bar  March on air ex x 10 reps - use of support bar  Seated heel slides x 10 Nu step level 6, x 6 minutes.   Heel slides on board x 15 with use of strap  SLR supine 2 x 10  Side glut med 2 x 10 RTB  SAQ 2 x 12  Heel slides x 10 with strap on  board  HS curls with RTB 2 x 12  Standing heel raises 2 x 10  Slant board stretch x 45 seconds  Step up forward x 10 on 4\" step - no pain-use of support bar  March on air ex x 30 reps- use of support bar  Wobble board a/p, s/s x 10 with support bar  Seated heel slides x 10 Nu step level 5, x 6 minutes.   Heel slides on board x 12 with use of strap  SLR supine 2 x 10  Side glut med 2 x 10 RTB  SAQ 2 x 12  HS curls with RTB 2 x 12 - seated  Standing heel raises 2 x 10  Step up forward x 10 on 5\" step - no pain-use of support bar  March on air ex x 30 sec- use of support bar  Tandem stands x 15 sec R/L behind  Wobble board a/p, s/s x 10 with support bar  Seated heel slides x 10   Therapeutic Exercise Min 45  40 40   Total of Timed Procedures 45 0 40 40   Total of Service Based 0 0 0 0   Total Treatment Time 45 0 40 40         HEP  No changes      Charges           Ex 3

## 2025-02-14 ENCOUNTER — OFFICE VISIT (OUTPATIENT)
Dept: PHYSICAL THERAPY | Age: 65
End: 2025-02-14
Payer: COMMERCIAL

## 2025-02-14 PROCEDURE — 97110 THERAPEUTIC EXERCISES: CPT | Performed by: PHYSICAL THERAPIST

## 2025-02-14 NOTE — PROGRESS NOTES
Patient: Charmaine Bishop  Referring Provider:  Insurance:   Diagnosis: Primary osteoarthritis of right knee (M17.11) Crissnati Peace  [employer]   Date of Surgery: NA Next MD visit:  N/A   Precautions:  Fall Risk NA Referral Information:    Date of Evaluation: Req: 0, Auth: 0, Exp:     01/24/25 POC Auth Visits:  12       Today's Date   2/14/2025    Subjective  Patient reports knee pain continues to be low.       Pain: 1/10     Objective  see outpatient daily record                Assessment   Patient able to increase to 6\" step this session, able to add light weight with SAQ and able to advance to tandem balance on air ex.  No increase in pain.    Goals (to be met in 12 visits)   Goals       Therapy Goals     Patient to be independent with HPE  Patient to have equal ROM R to L knee  Patient to be able to walk with decrease lateral lean to R  Patient to be able to go up/down stairs with minimal pain R knee.   Patient to use walker for next few weeks and decrease stairs to reduce strain at R knee.  Patient to report 90% improvement from initial session.              Plan   Continue LE balance work, strengthening and ROM.     Treatment Last 4 Visits       1/30/2025 2/4/2025 2/11/2025 2/14/2025   PT Treatment   Treatment Day 2 3 4 5   Therapeutic Exercise Nu step level 4, x 5 minutes.   Heel slides on board x 12 with use of strap  SLR supine 2 x 10  Side glut med 2 x 10  SAQ 2 x 10  Heel slides x 10 with strap on board  Standing heel raises 2 x 10  Slant board stretch x 45 seconds  Step up forward x 5 on 3\" step - no pain-use of support bar  March on air ex x 10 reps - use of support bar  Seated heel slides x 10 Nu step level 6, x 6 minutes.   Heel slides on board x 15 with use of strap  SLR supine 2 x 10  Side glut med 2 x 10 RTB  SAQ 2 x 12  Heel slides x 10 with strap on board  HS curls with RTB 2 x 12  Standing heel raises 2 x 10  Slant board stretch x 45 seconds  Step up forward x 10 on 4\" step - no pain-use of support  bar  March on air ex x 30 reps- use of support bar  Wobble board a/p, s/s x 10 with support bar  Seated heel slides x 10 Nu step level 5, x 6 minutes.   Heel slides on board x 12 with use of strap  SLR supine 2 x 10  Side glut med 2 x 10 RTB  SAQ 2 x 12  HS curls with RTB 2 x 12 - seated  Standing heel raises 2 x 10  Step up forward x 10 on 5\" step - no pain-use of support bar  March on air ex x 30 sec- use of support bar  Tandem stands x 15 sec R/L behind  Wobble board a/p, s/s x 10 with support bar  Seated heel slides x 10 Nu step level 6 for 5 min, level 7 for 2 minutes   Heel slides on board x 15 with use of strap  SLR supine 2 x 10  Side glut med 2 x 10 RTB  SAQ 2 x 15  11/2#  HS curls with RTB 2 x 12 - seated  Standing heel raises 2 x 10  Step up forward x 10 on 6\" step - use of hand rails  March on air ex x 30 sec- use of support bar  Tandem stands x 15 sec R/L behind- on air ex  Wobble board a/p, s/s x 10 with support bar  Seated heel slides x 10   Therapeutic Exercise Min  40 40    Total of Timed Procedures 0 40 40 0   Total of Service Based 0 0 0 0   Total Treatment Time 0 40 40 0         HEP  No changes      Charges           Ex 3 40 min

## 2025-02-21 ENCOUNTER — OFFICE VISIT (OUTPATIENT)
Dept: PHYSICAL THERAPY | Age: 65
End: 2025-02-21
Payer: COMMERCIAL

## 2025-02-21 PROCEDURE — 97110 THERAPEUTIC EXERCISES: CPT | Performed by: PHYSICAL THERAPIST

## 2025-02-21 NOTE — PROGRESS NOTES
Patient: Charmaine Bishop  Referring Provider:  Insurance:   Diagnosis: Primary osteoarthritis of right knee (M17.11) Criss CARBAJAL   Date of Surgery: NA Next MD visit:  N/A   Precautions:  Fall Risk NA Referral Information:    Date of Evaluation: Req: 0, Auth: 0, Exp:     01/24/25 POC Auth Visits:  12       Today's Date   2/21/2025    Subjective  Patient reports increase in walking yesterday did make the knee more painful and feels some swelling.       Pain: 2/10     Objective  see outpatient daily record            Assessment   Patient able to do wobble board s/s for some reps without use of support bar, improved tandem stands on air ex.  Patient reporting decrease in pain end of session.     Goals (to be met in 12 visits)   Goals       Therapy Goals     Patient to be independent with HPE  Patient to have equal ROM R to L knee  Patient to be able to walk with decrease lateral lean to R  Patient to be able to go up/down stairs with minimal pain R knee.   Patient to use walker for next few weeks and decrease stairs to reduce strain at R knee.  Patient to report 90% improvement from initial session.              Plan   Continue ROM, strengthening, balance work.  Advance as able.    Treatment Last 4 Visits       2/4/2025 2/11/2025 2/14/2025 2/21/2025   PT Treatment   Treatment Day 3 4 5 6   Therapeutic Exercise Nu step level 6, x 6 minutes.   Heel slides on board x 15 with use of strap  SLR supine 2 x 10  Side glut med 2 x 10 RTB  SAQ 2 x 12  Heel slides x 10 with strap on board  HS curls with RTB 2 x 12  Standing heel raises 2 x 10  Slant board stretch x 45 seconds  Step up forward x 10 on 4\" step - no pain-use of support bar  March on air ex x 30 reps- use of support bar  Wobble board a/p, s/s x 10 with support bar  Seated heel slides x 10 Nu step level 5, x 6 minutes.   Heel slides on board x 12 with use of strap  SLR supine 2 x 10  Side glut med 2 x 10 RTB  SAQ 2 x 12  HS curls with RTB 2 x 12 -  seated  Standing heel raises 2 x 10  Step up forward x 10 on 5\" step - no pain-use of support bar  March on air ex x 30 sec- use of support bar  Tandem stands x 15 sec R/L behind  Wobble board a/p, s/s x 10 with support bar  Seated heel slides x 10 Nu step level 6 for 5 min, level 7 for 2 minutes   Heel slides on board x 15 with use of strap  SLR supine 2 x 10  Side glut med 2 x 10 RTB  SAQ 2 x 15  11/2#  HS curls with RTB 2 x 12 - seated  Standing heel raises 2 x 10  Step up forward x 10 on 6\" step - use of hand rails  March on air ex x 30 sec- use of support bar  Tandem stands x 15 sec R/L behind- on air ex  Wobble board a/p, s/s x 10 with support bar  Seated heel slides x 10 Nu step level 6 for 5 min, level 6   Heel slides on board x 15 with use of strap  SLR supine 2 x 10 1 1/2# just below the knee  QS x 12 , hold 3 seconds  Side glut med 2 x 12 RTB  SAQ 2 x 15  1 1/2#  HS curls with RTB 2 x 12 - seated  Standing heel raises 2 x 10  Step up forward x 10 on 6\" step - use of hand rails  March on air ex x 30 sec- use of support bar  Tandem stands x 15 sec R/L behind- on air ex  Wobble board a/p, s/s x 10 with support bar  Seated heel slides x 10  Hip abd x 10 each with YTB  Hip extension x 10 each with YTB    Therapeutic Exercise Min 40 40  42   Total of Timed Procedures 40 40 0 42   Total of Service Based 0 0 0 0   Total Treatment Time 40 40 0 42         HEP  No changes      Charges           Ex 3

## 2025-02-28 ENCOUNTER — OFFICE VISIT (OUTPATIENT)
Dept: PHYSICAL THERAPY | Age: 65
End: 2025-02-28
Payer: COMMERCIAL

## 2025-02-28 PROCEDURE — 97110 THERAPEUTIC EXERCISES: CPT | Performed by: PHYSICAL THERAPIST

## 2025-02-28 NOTE — PROGRESS NOTES
Patient: Charmaine Bishop  Referring Provider:  Insurance:   Diagnosis: Primary osteoarthritis of right knee (M17.11) Criss Peace  BRADYISAMAR   Date of Surgery: NA Next MD visit:  N/A   Precautions:  Fall Risk NA Referral Information:    Date of Evaluation: Req: 0, Auth: 0, Exp:     01/24/25 POC Auth Visits:  12       Today's Date   2/28/2025    Subjective   Patient reports when goes into work gets more swelling, but pain levels have stayed very low. Patient reports she is feeling better overall       Pain: 0/10     Objective  see outpatient daily record            Assessment   Added step downs, side step ups and cone taps on floor to exercises this session.  Patient without c/o of any increase in pain.  Was able to do a few cone taps without use of support bar.      Goals (to be met in 12 visits)   Goals       Therapy Goals     Patient to be independent with HPE  Patient to have equal ROM R to L knee  Patient to be able to walk with decrease lateral lean to R  Patient to be able to go up/down stairs with minimal pain R knee.   Patient to use walker for next few weeks and decrease stairs to reduce strain at R knee.  Patient to report 90% improvement from initial session.              Plan   Continue LE balance and strengthening, flexibility work.      Treatment Last 4 Visits       2/11/2025 2/14/2025 2/21/2025 2/28/2025   PT Treatment   Treatment Day 4 5 6 7   Therapeutic Exercise Nu step level 5, x 6 minutes.   Heel slides on board x 12 with use of strap  SLR supine 2 x 10  Side glut med 2 x 10 RTB  SAQ 2 x 12  HS curls with RTB 2 x 12 - seated  Standing heel raises 2 x 10  Step up forward x 10 on 5\" step - no pain-use of support bar  March on air ex x 30 sec- use of support bar  Tandem stands x 15 sec R/L behind  Wobble board a/p, s/s x 10 with support bar  Seated heel slides x 10 Nu step level 6 for 5 min, level 7 for 2 minutes   Heel slides on board x 15 with use of strap  SLR supine 2 x 10  Side glut med 2 x 10  RTB  SAQ 2 x 15  11/2#  HS curls with RTB 2 x 12 - seated  Standing heel raises 2 x 10  Step up forward x 10 on 6\" step - use of hand rails  March on air ex x 30 sec- use of support bar  Tandem stands x 15 sec R/L behind- on air ex  Wobble board a/p, s/s x 10 with support bar  Seated heel slides x 10 Nu step level 6 for 5 min, level 6   Heel slides on board x 15 with use of strap  SLR supine 2 x 10 1 1/2# just below the knee  QS x 12 , hold 3 seconds  Side glut med 2 x 12 RTB  SAQ 2 x 15  1 1/2#  HS curls with RTB 2 x 12 - seated  Standing heel raises 2 x 10  Step up forward x 10 on 6\" step - use of hand rails  March on air ex x 30 sec- use of support bar  Tandem stands x 15 sec R/L behind- on air ex  Wobble board a/p, s/s x 10 with support bar  Seated heel slides x 10  Hip abd x 10 each with YTB  Hip extension x 10 each with YTB  Nu step level 7 for 5 min  Heel slides on board x 15 with use of strap  SLR supine 2 x 10 2# just below the knee  QS x 12 , hold 3 seconds  Side glut med 2 x 12 RTB  SAQ 2 x 15  2#  HS curls with RTB 2 x 12 - seated  Standing heel raises 2 x 10  Step up forward x 10 on 6\" step - use of hand rails  Step up side x 10 , 6\" step with hand rails.  March on air ex x 30 sec- use of support bar  Tandem stands x 15 sec R/L behind- on air ex- 2 rounds  Wobble board a/p, s/s x 10 with support bar  Seated heel slides x 10  Slant board stretch 40 sec  Step downs 3\" x 5 with support bar  Hip abd x 15 each with YTB  Hip extension x 10 each with YTB    Therapeutic Exercise Min 40  42 40   Total of Timed Procedures 40 0 42 40   Total of Service Based 0 0 0 0   Total Treatment Time 40 0 42 40         HEP  No changes      Charges           Ex 3

## 2025-03-07 ENCOUNTER — OFFICE VISIT (OUTPATIENT)
Dept: PHYSICAL THERAPY | Age: 65
End: 2025-03-07
Payer: COMMERCIAL

## 2025-03-07 PROCEDURE — 97110 THERAPEUTIC EXERCISES: CPT | Performed by: PHYSICAL THERAPIST

## 2025-03-07 NOTE — PROGRESS NOTES
Patient: Charmaine Bishop (65 year old, female) Referring Provider:  Insurance:   Diagnosis: Primary osteoarthritis of right knee (M17.11) No ref. provider found  [employer]   Date of Surgery: NA Next MD visit:  N/A   Precautions:  Fall Risk NA Referral Information:    Date of Evaluation: Req: 0, Auth: 0, Exp:     01/24/25 POC Auth Visits:  12       Today's Date   3/7/2025    Subjective  Patient reports the knee is doing well,  Did have to go 2 days in a row to work, did take some Aleve the second day just in case.  Otherwise hasnt taken any medicine.       Pain: 1/10     Objective  see outpatient daily record          Assessment   R knee ROM has continued to improve and balance is improving as patient able to not hold for wobble board s/s, and 1/2 the reps with A/P.      Goals (to be met in 12 visits)   Therapy Goals        Patient to be independent with HPE  Patient to have equal ROM R to L knee  Patient to be able to walk with decrease lateral lean to R  Patient to be able to go up/down stairs with minimal pain R knee.   Patient to use walker for next few weeks and decrease stairs to reduce strain at R knee.  Patient to report 90% improvement from initial session.        Plan  Continue work on ROM, strengthening, gait, balance , advance as able.    Treatment Last 4 Visits       2/14/2025 2/21/2025 2/28/2025 3/7/2025   PT Treatment   Treatment Day 5 6 7 8   Therapeutic Exercise Nu step level 6 for 5 min, level 7 for 2 minutes   Heel slides on board x 15 with use of strap  SLR supine 2 x 10  Side glut med 2 x 10 RTB  SAQ 2 x 15  11/2#  HS curls with RTB 2 x 12 - seated  Standing heel raises 2 x 10  Step up forward x 10 on 6\" step - use of hand rails  March on air ex x 30 sec- use of support bar  Tandem stands x 15 sec R/L behind- on air ex  Wobble board a/p, s/s x 10 with support bar  Seated heel slides x 10 Nu step level 6 for 5 min, level 6   Heel slides on board x 15 with use of strap  SLR supine 2 x 10 1 1/2# just  below the knee  QS x 12 , hold 3 seconds  Side glut med 2 x 12 RTB  SAQ 2 x 15  1 1/2#  HS curls with RTB 2 x 12 - seated  Standing heel raises 2 x 10  Step up forward x 10 on 6\" step - use of hand rails  March on air ex x 30 sec- use of support bar  Tandem stands x 15 sec R/L behind- on air ex  Wobble board a/p, s/s x 10 with support bar  Seated heel slides x 10  Hip abd x 10 each with YTB  Hip extension x 10 each with YTB  Nu step level 7 for 5 min  Heel slides on board x 15 with use of strap  SLR supine 2 x 10 2# just below the knee  QS x 12 , hold 3 seconds  Side glut med 2 x 12 RTB  SAQ 2 x 15  2#  HS curls with RTB 2 x 12 - seated  Standing heel raises 2 x 10  Step up forward x 10 on 6\" step - use of hand rails  Step up side x 10 , 6\" step with hand rails.  March on air ex x 30 sec- use of support bar  Tandem stands x 15 sec R/L behind- on air ex- 2 rounds  Wobble board a/p, s/s x 10 with support bar  Seated heel slides x 10  Slant board stretch 40 sec  Step downs 3\" x 5 with support bar  Hip abd x 15 each with YTB  Hip extension x 10 each with YTB     Therapeutic Exercise Min  42 40    Total of Timed Procedures 0 42 40    Total of Service Based 0 0 0    Total Treatment Time 0 42 40           2/14/2025 2/21/2025 2/28/2025 3/7/2025   LE Treatment   Treatment Day 5 6 7 8   Therapeutic Exercise    Nu step level 8 for 7 min  Heel slides on board x 15 with use of strap  SLR supine 2 x 10 2# just below the knee  AROM R knee flexion 122, Ext -2 AROM  QS x 15 , hold 2 seconds  Side glut med 2 x 12 RTB  SAQ 2 x 15  2#  HS curls with RTB 2 x 12 - seated  Standing heel raises 2 x 10  Step up forward x 10 on 6\" step - use of hand rails  Step up side x 10 , 6\" step with hand rails.  March on air ex x 30 sec- use of support bar  Tandem stands x 15 sec R/L behind- on air ex- 2 rounds  Wobble board a/p, s/s x 10 with support bar  Seated heel slides x 10  Slant board stretch 40 sec  Step downs 3\" x 10 with support bar  Hip abd  x 15 each with YTB  Hip extension x 10 each with YTB   SLS floor with cone touches- x 3, 4 rounds   Therapeutic Exercise Minutes    40   Total Time Of Timed Procedures    40   Total Time Of Service-Based Procedures    0   Total Treatment Time    40        HEP   No changes    Charges   Ex 3- 40 minutes

## 2025-03-10 ENCOUNTER — TELEPHONE (OUTPATIENT)
Dept: PHYSICAL THERAPY | Facility: HOSPITAL | Age: 65
End: 2025-03-10

## 2025-03-11 ENCOUNTER — APPOINTMENT (OUTPATIENT)
Dept: PHYSICAL THERAPY | Age: 65
End: 2025-03-11
Payer: COMMERCIAL

## 2025-03-14 ENCOUNTER — OFFICE VISIT (OUTPATIENT)
Dept: PHYSICAL THERAPY | Age: 65
End: 2025-03-14
Payer: COMMERCIAL

## 2025-03-14 PROCEDURE — 97110 THERAPEUTIC EXERCISES: CPT | Performed by: PHYSICAL THERAPIST

## 2025-03-14 NOTE — PROGRESS NOTES
Patient: Charmaine Bishop  Referring Provider:  Insurance:   Diagnosis: Primary osteoarthritis of right knee (M17.11) Altagracia Peace   BRADYISAMAR   Date of Surgery: NA Next MD visit:  N/A   Precautions:  Fall Risk NA Referral Information:    Date of Evaluation: Req: 0, Auth: 0, Exp:     01/24/25 POC Auth Visits:  12       Today's Date   3/14/2025    Subjective  Patient reports no pain, just stiffness at the knee.       Pain: 0/10     Objective  see outpatient daily record            Assessment   Added slight increase in weight with exercises, added more work on air ex with exercises and increased forward step to 8\".  Patient without complaints during session.    Goals (to be met in 12 visits)   Therapy Goals        Patient to be independent with HPE  Patient to have equal ROM R to L knee  Patient to be able to walk with decrease lateral lean to R  Patient to be able to go up/down stairs with minimal pain R knee.   Patient to use walker for next few weeks and decrease stairs to reduce strain at R knee.  Patient to report 90% improvement from initial session.            Plan  Continue work on ROM, strengthening, gait, balance , advance as able.  Reassess next session as patient will then be returning for follow up.    Treatment Last 4 Visits       2/21/2025 2/28/2025 3/7/2025 3/14/2025   PT Treatment   Treatment Day 6 7 8 9   Therapeutic Exercise Nu step level 6 for 5 min, level 6   Heel slides on board x 15 with use of strap  SLR supine 2 x 10 1 1/2# just below the knee  QS x 12 , hold 3 seconds  Side glut med 2 x 12 RTB  SAQ 2 x 15  1 1/2#  HS curls with RTB 2 x 12 - seated  Standing heel raises 2 x 10  Step up forward x 10 on 6\" step - use of hand rails  March on air ex x 30 sec- use of support bar  Tandem stands x 15 sec R/L behind- on air ex  Wobble board a/p, s/s x 10 with support bar  Seated heel slides x 10  Hip abd x 10 each with YTB  Hip extension x 10 each with YTB  Nu step level 7 for 5 min  Heel slides on board  x 15 with use of strap  SLR supine 2 x 10 2# just below the knee  QS x 12 , hold 3 seconds  Side glut med 2 x 12 RTB  SAQ 2 x 15  2#  HS curls with RTB 2 x 12 - seated  Standing heel raises 2 x 10  Step up forward x 10 on 6\" step - use of hand rails  Step up side x 10 , 6\" step with hand rails.  March on air ex x 30 sec- use of support bar  Tandem stands x 15 sec R/L behind- on air ex- 2 rounds  Wobble board a/p, s/s x 10 with support bar  Seated heel slides x 10  Slant board stretch 40 sec  Step downs 3\" x 5 with support bar  Hip abd x 15 each with YTB  Hip extension x 10 each with YTB      Therapeutic Exercise Min 42 40     Total of Timed Procedures 42 40     Total of Service Based 0 0     Total Treatment Time 42 40            2/21/2025 2/28/2025 3/7/2025 3/14/2025   LE Treatment   Treatment Day 6 7 8 9   Therapeutic Exercise   Nu step level 8 for 7 min  Heel slides on board x 15 with use of strap  SLR supine 2 x 10 2# just below the knee  AROM R knee flexion 122, Ext -2 AROM  QS x 15 , hold 2 seconds  Side glut med 2 x 12 RTB  SAQ 2 x 15  2#  HS curls with RTB 2 x 12 - seated  Standing heel raises 2 x 10  Step up forward x 10 on 6\" step - use of hand rails  Step up side x 10 , 6\" step with hand rails.  March on air ex x 30 sec- use of support bar  Tandem stands x 15 sec R/L behind- on air ex- 2 rounds  Wobble board a/p, s/s x 10 with support bar  Seated heel slides x 10  Slant board stretch 40 sec  Step downs 3\" x 10 with support bar  Hip abd x 15 each with YTB  Hip extension x 10 each with YTB   SLS floor with cone touches- x 3, 4 rounds Nu step level 8 for 7 min  Heel slides on board x 15 with use of strap  SLR supine 2 x 10 2 1/2# just below the knee  QS x 15 , hold 2 seconds  Side glut med 2 x 12 RTB  SAQ 2 x 15  2 1/2#  HS curls with RTB 2 x 12 - seated  Standing heel raises 1 x 10  Step up forward x 10 on 8\" step - use of hand rails  Step up side x 10 , 6\" step with hand rails.  March on air ex x 30 sec-  minimal use of support bar  Cone touches on air ex (2) cones for 5 rounds- use of support bar  Tandem stands x 15 sec R/L behind- on air ex- 2 rounds  Wobble board a/p, s/s x 10 min use of support bar a/p, s/s able to do some without support bar    Slant board stretch 45 sec  Step downs 3\" x 10 with support bar  Hip abd x 15 each on air ex with YTB  Hip extension x 12 each with YTB   SLS floor with cone touches- x 2, 5 rounds without support bar   Therapeutic Exercise Minutes   40 40   Total Time Of Timed Procedures   40 40   Total Time Of Service-Based Procedures   0 0   Total Treatment Time   40 40        HEP   No changes    Charges   Ex 3

## 2025-03-25 ENCOUNTER — OFFICE VISIT (OUTPATIENT)
Dept: PHYSICAL THERAPY | Age: 65
End: 2025-03-25
Payer: COMMERCIAL

## 2025-03-25 PROCEDURE — 97110 THERAPEUTIC EXERCISES: CPT | Performed by: PHYSICAL THERAPIST

## 2025-03-25 NOTE — PROGRESS NOTES
Patient: Charmaine Bishop  Referring Provider:  Insurance:   Diagnosis: Primary osteoarthritis of right knee (M17.11) Criss CARBAJAL   Date of Surgery: NA Next MD visit:  N/A   Precautions:  Fall Risk NA Referral Information:    Date of Evaluation: Req: 0, Auth: 0, Exp:     01/24/25 POC Auth Visits:  12    PROGRESS NOTE - progressing well,, AROM both knees 0-125, improved strength, no pain with daily activities, able to go up/down stairs without pain, no use of assistive device  2 visits remain and will then discharge to independent [facility]   Today's Date   3/25/2025    Subjective   Patient reports no pain at the knee, has been good.  States follows up with PA on Friday.  States overall feels 90% improved, is able to go up/down stairs without pain, able to walk and stand as much as she can with fatigue the limit not pain.  Feels her balance and walking is better.        Pain: 0/10      Objective     See outpatient daily record.  AROM 0-125 R/L knee,   MMT:  hip abd R 4/5, initially 4 minus/5             Hip ext 4/5,   initially 4 minus/5             HS 5/5,  initially 5/5             Quad 4+/5, Initially 4/5             IR 5/5,  Initially 4/5             ER 4/5 now and initially.    Balance - Able to now do SLB for 5 sec or greater, initially unable to attempt.   Wobble board - able to perform s/s without support bar, A/P motion 75% without use of bar - initially had to hold bar for all.  SLS with cone touches- able to perform on floor without assist of bar - initially unable to perform   Assessment   Patient presently has made great progress with ROM, balance and LE strength.  Is able to now go up/down stairs without pain, walk unlimited/stand unlimited - limiting factor now is fatigue not pain.  No assistive device is needed.  Patient has met goals set    Goals (to be met in 12 visits)   Therapy Goals        Patient to be independent with HPE  Patient to have equal ROM R to L knee  Patient to be able to walk  with decrease lateral lean to R  Patient to be able to go up/down stairs with minimal pain R knee.   Patient to use walker for next few weeks and decrease stairs to reduce strain at R knee.  Patient to report 90% improvement from initial session.                Plan  Continue work on ROM, strengthening, gait, balance , advance as able. Continue x 2 additional visits, then plan discharge.     Treatment Last 4 Visits       2/28/2025 3/7/2025 3/14/2025 3/25/2025   PT Treatment   Treatment Day 7 8 9 10   Therapeutic Exercise Nu step level 7 for 5 min  Heel slides on board x 15 with use of strap  SLR supine 2 x 10 2# just below the knee  QS x 12 , hold 3 seconds  Side glut med 2 x 12 RTB  SAQ 2 x 15  2#  HS curls with RTB 2 x 12 - seated  Standing heel raises 2 x 10  Step up forward x 10 on 6\" step - use of hand rails  Step up side x 10 , 6\" step with hand rails.  March on air ex x 30 sec- use of support bar  Tandem stands x 15 sec R/L behind- on air ex- 2 rounds  Wobble board a/p, s/s x 10 with support bar  Seated heel slides x 10  Slant board stretch 40 sec  Step downs 3\" x 5 with support bar  Hip abd x 15 each with YTB  Hip extension x 10 each with YTB       Therapeutic Exercise Min 40      Total of Timed Procedures 40      Total of Service Based 0      Total Treatment Time 40             2/28/2025 3/7/2025 3/14/2025 3/25/2025   LE Treatment   Treatment Day 7 8 9 10   Therapeutic Exercise  Nu step level 8 for 7 min  Heel slides on board x 15 with use of strap  SLR supine 2 x 10 2# just below the knee  AROM R knee flexion 122, Ext -2 AROM  QS x 15 , hold 2 seconds  Side glut med 2 x 12 RTB  SAQ 2 x 15  2#  HS curls with RTB 2 x 12 - seated  Standing heel raises 2 x 10  Step up forward x 10 on 6\" step - use of hand rails  Step up side x 10 , 6\" step with hand rails.  March on air ex x 30 sec- use of support bar  Tandem stands x 15 sec R/L behind- on air ex- 2 rounds  Wobble board a/p, s/s x 10 with support bar  Seated  heel slides x 10  Slant board stretch 40 sec  Step downs 3\" x 10 with support bar  Hip abd x 15 each with YTB  Hip extension x 10 each with YTB   SLS floor with cone touches- x 3, 4 rounds Nu step level 8 for 7 min  Heel slides on board x 15 with use of strap  SLR supine 2 x 10 2 1/2# just below the knee  QS x 15 , hold 2 seconds  Side glut med 2 x 12 RTB  SAQ 2 x 15  2 1/2#  HS curls with RTB 2 x 12 - seated  Standing heel raises 1 x 10  Step up forward x 10 on 8\" step - use of hand rails  Step up side x 10 , 6\" step with hand rails.  March on air ex x 30 sec- minimal use of support bar  Cone touches on air ex (2) cones for 5 rounds- use of support bar  Tandem stands x 15 sec R/L behind- on air ex- 2 rounds  Wobble board a/p, s/s x 10 min use of support bar a/p, s/s able to do some without support bar    Slant board stretch 45 sec  Step downs 3\" x 10 with support bar  Hip abd x 15 each on air ex with YTB  Hip extension x 12 each with YTB   SLS floor with cone touches- x 2, 5 rounds without support bar Nu step level 10 for 7 min  Heel slides on board x 15 with use of strap  SLR supine 2 x 10 2 1/2# just below the knee  QS x 15 , hold 2 seconds  Side glut med 2 x 12 RTB  SAQ 2 x 15  2 1/2#  HS curls with RTB 2 x 12 - seated  Standing heel raises 1 x 10  Step up forward x 10 on 8\" step - use of hand rails  Step up side x 10 , 6\" step with hand rails.  March on air ex x 30 sec- minimal use of support bar  Cone touches on air ex (3) cones for 5 rounds- use of support bar  Tandem stands x 15/30 sec R/L behind- on air ex- 2 rounds  Wobble board a/p, s/s x 10 min use of support bar a/p, s/s no support bar    Slant board stretch 45 sec  Step downs 4\" x 10 with support bar  Hip abd x 15 each with YTB  Hip extension x 12 each with YTB   SLS floor with cone touches- x 2, 5 rounds without support bar   Therapeutic Exercise Minutes  40 40 40   Total Time Of Timed Procedures  40 40 40   Total Time Of Service-Based Procedures  0 0  0   Total Treatment Time  40 40 40        HEP   No changes    Charges   Ex 3

## 2025-03-28 ENCOUNTER — OFFICE VISIT (OUTPATIENT)
Dept: ORTHOPEDICS CLINIC | Facility: CLINIC | Age: 65
End: 2025-03-28
Payer: COMMERCIAL

## 2025-03-28 ENCOUNTER — APPOINTMENT (OUTPATIENT)
Dept: PHYSICAL THERAPY | Age: 65
End: 2025-03-28
Payer: COMMERCIAL

## 2025-03-28 VITALS — BODY MASS INDEX: 47.8 KG/M2 | WEIGHT: 280 LBS | HEIGHT: 64 IN

## 2025-03-28 DIAGNOSIS — M17.11 PRIMARY OSTEOARTHRITIS OF RIGHT KNEE: Primary | ICD-10-CM

## 2025-03-28 PROCEDURE — 99213 OFFICE O/P EST LOW 20 MIN: CPT

## 2025-03-28 NOTE — PROGRESS NOTES
[facility] Progress Note         The following individual(s) verbally consented to be recorded using ambient AI listening technology and understand that they can each withdraw their consent to this listening technology at any point by asking the clinician to turn off or pause the recording:    Patient name: Charmaine Bishop    History of Present Illness  Charmaine Bishop is a 65 year old female who presents for follow-up after Durolane injection for right knee pain.    She has experienced significant improvement in her knee pain following a Durolane injection administered two months ago. She describes being 'pretty much pain free' since the injection and is satisfied with the results. Prior to this, she had a steroid injection three months ago, which did not significantly alleviate her symptoms.    She has also been attending formal physical therapy and noticing significant improvement in her stability, balance, and range of motion.    Currently, she is not using any pain medication and is pleased with the current state of her knee.  She is here today for follow-up.    Objective: Very pleasant 65-year-old female who does not appear in any acute distress.  Awake, alert, and oriented to person, place, and time.  Unlabored breathing.  Appropriate mood and affect.  She ambulates with a nonantalgic gait.  Exam of the right knee and lower extremity reveals overlying skin is intact.  No ecchymosis or erythema.  Moderate swelling noted.  No calf pain or tenderness.  Negative Homans' sign.  Sensation is present to light touch.    Physical Exam      Results      Assessment & Plan  Assessment:  65 year old female with moderate primary osteoarthritis of the right knee    Plan: Overall, she is doing very well and noted significant improvement after Durolane injection approximately 2 months ago.  She currently reports being \"pretty much pain-free at this point\" and has been noticing significant improvement with physical  therapy as well.  She hopes to be more active this summer and anticipates being able to do so now that her pain is well-controlled with injections and physical therapy.  As such, recommend continuing with formal physical therapy and at home exercise program.  She would like to do a repeat Durolane injection when eligible after July 31, 2025.  I advised I will put in the authorization closer to that time and our office will call her when approved and received in our office so she can set up a Durolane injection appointment.  She will follow-up sooner with any worsening symptoms or concerns.  All questions and concerns were addressed and answered to patient satisfaction.  She is in agreement with treatment plan going forward.      KATIE Stringer, PA-C  Orthopedic Surgery   [address] [place] [id number]   t: [phone]  f: [phone]         This document was partially prepared using Dragon Medical voice recognition software.  Although every attempt is made to correct errors during dictation, discrepancies may still exist. Please contact me with any questions or clarifications.

## 2025-03-31 ENCOUNTER — TELEPHONE (OUTPATIENT)
Facility: CLINIC | Age: 65
End: 2025-03-31

## 2025-03-31 NOTE — TELEPHONE ENCOUNTER
[facility].    Future Appointments   Date Time Provider Department Center   8/1/2025  9:00 AM Criss Peace PA-C [facility]     Please advise.

## 2025-04-03 ENCOUNTER — OFFICE VISIT (OUTPATIENT)
Dept: PHYSICAL THERAPY | Age: 65
End: 2025-04-03
Payer: COMMERCIAL

## 2025-04-03 PROCEDURE — 97110 THERAPEUTIC EXERCISES: CPT | Performed by: PHYSICAL THERAPIST

## 2025-04-03 NOTE — PROGRESS NOTES
Patient: Charmaine Bishop  Referring Provider:  Insurance:   Diagnosis: Primary osteoarthritis of right knee (M17.11) LEANDRA Stringer   Date of Surgery: NA Next MD visit:  N/A   Precautions:  Fall Risk NA Referral Information:    Date of Evaluation: Req: 0, Auth: 0, Exp:     01/24/25 POC Auth Visits:  12       Today's Date   4/3/2025    Subjective   Patient reports did follow up with PA.  Is going to get gel shots every 6 months.  States has been feeling good.  No reported pain       Pain: 0/100/10     Objective  see outpatient daily record              Assessment   Continued ROM, strengthening and balance work.  No complaints.    Goals (to be met in 12 visits)   Therapy Goals        Patient to be independent with HPE  Patient to have equal ROM R to L knee  Patient to be able to walk with decrease lateral lean to R  Patient to be able to go up/down stairs with minimal pain R knee.   Patient to use walker for next few weeks and decrease stairs to reduce strain at R knee.  Patient to report 90% improvement from initial session.                    Plan  Reassess next session with plan for discharge.    Treatment Last 4 Visits  Treatment Day: 11       3/7/2025 3/14/2025 3/25/2025 4/3/2025   LE Treatment   Therapeutic Exercise Nu step level 8 for 7 min  Heel slides on board x 15 with use of strap  SLR supine 2 x 10 2# just below the knee  AROM R knee flexion 122, Ext -2 AROM  QS x 15 , hold 2 seconds  Side glut med 2 x 12 RTB  SAQ 2 x 15  2#  HS curls with RTB 2 x 12 - seated  Standing heel raises 2 x 10  Step up forward x 10 on 6\" step - use of hand rails  Step up side x 10 , 6\" step with hand rails.  March on air ex x 30 sec- use of support bar  Tandem stands x 15 sec R/L behind- on air ex- 2 rounds  Wobble board a/p, s/s x 10 with support bar  Seated heel slides x 10  Slant board stretch 40 sec  Step downs 3\" x 10 with support bar  Hip abd x 15 each with YTB  Hip extension x 10 each with YTB   SLS floor with  cone touches- x 3, 4 rounds Nu step level 8 for 7 min  Heel slides on board x 15 with use of strap  SLR supine 2 x 10 2 1/2# just below the knee  QS x 15 , hold 2 seconds  Side glut med 2 x 12 RTB  SAQ 2 x 15  2 1/2#  HS curls with RTB 2 x 12 - seated  Standing heel raises 1 x 10  Step up forward x 10 on 8\" step - use of hand rails  Step up side x 10 , 6\" step with hand rails.  March on air ex x 30 sec- minimal use of support bar  Cone touches on air ex (2) cones for 5 rounds- use of support bar  Tandem stands x 15 sec R/L behind- on air ex- 2 rounds  Wobble board a/p, s/s x 10 min use of support bar a/p, s/s able to do some without support bar    Slant board stretch 45 sec  Step downs 3\" x 10 with support bar  Hip abd x 15 each on air ex with YTB  Hip extension x 12 each with YTB   SLS floor with cone touches- x 2, 5 rounds without support bar Nu step level 10 for 7 min  Heel slides on board x 15 with use of strap  SLR supine 2 x 10 2 1/2# just below the knee  QS x 15 , hold 2 seconds  Side glut med 2 x 12 RTB  SAQ 2 x 15  2 1/2#  HS curls with RTB 2 x 12 - seated  Standing heel raises 1 x 10  Step up forward x 10 on 8\" step - use of hand rails  Step up side x 10 , 6\" step with hand rails.  March on air ex x 30 sec- minimal use of support bar  Cone touches on air ex (3) cones for 5 rounds- use of support bar  Tandem stands x 15/30 sec R/L behind- on air ex- 2 rounds  Wobble board a/p, s/s x 10 min use of support bar a/p, s/s no support bar    Slant board stretch 45 sec  Step downs 4\" x 10 with support bar  Hip abd x 15 each with YTB  Hip extension x 12 each with YTB   SLS floor with cone touches- x 2, 5 rounds without support bar Nu step level 10 for 7 min  Heel slides on board x 15 with use of strap  SLR supine 2 x 10 2 1/2# just below the knee  QS x 15 , hold 2 seconds  Side glut med 2 x 12 RTB  SAQ 2 x 15  2 1/2#  HS curls with RTB 2 x 12 - seated  Standing heel raises 1 x 10  Step up forward x 10 on 6\" step -  use of hand rails  Step up side x 10 , 6\" step with hand rails.  March on air ex x 30 sec- minimal use of support bar  Cone touches on air ex (3) cones for 5 rounds- use of support bar  Tandem stands x 15/30 sec R/L behind- on air ex- 2 rounds  Wobble board a/p, s/s x 10 min use of support bar a/p, s/s no support bar    Slant board stretch 45 sec  Step downs 4\" x 10 with support bar  Hip abd x 15 each with YTB  Hip extension x 12 each with YTB   SLS floor with cone touches- x 2, 5 rounds without support bar   Therapeutic Exercise Minutes 40 40 40 42   Total Time Of Timed Procedures 40 40 40 42   Total Time Of Service-Based Procedures 0 0 0 0   Total Treatment Time 40 40 40 42        HEP   No changes    Charges      Ex 3

## 2025-04-10 ENCOUNTER — APPOINTMENT (OUTPATIENT)
Dept: PHYSICAL THERAPY | Age: 65
End: 2025-04-10
Payer: COMMERCIAL

## 2025-04-29 ENCOUNTER — OFFICE VISIT (OUTPATIENT)
Dept: PHYSICAL THERAPY | Age: 65
End: 2025-04-29
Payer: COMMERCIAL

## 2025-04-29 PROCEDURE — 97110 THERAPEUTIC EXERCISES: CPT | Performed by: PHYSICAL THERAPIST

## 2025-04-29 NOTE — PROGRESS NOTES
Patient: Charmaine Bishop (65 year old, female) Referring Provider:  Insurance:   Diagnosis: Primary osteoarthritis of right knee (M17.11) Criss CARBAJAL   Date of Surgery: NA Next MD visit:  N/A   Precautions:  Fall Risk NA Referral Information:    Date of Evaluation: Req: 0, Auth: 0, Exp:     01/24/25 POC Auth Visits:  12    DISCHARGE SUMMARY   Today's Date   4/29/2025    Subjective   Patient reports the knee has continued to do well.  No questions with her ongoing HEP       Pain: 0/10     Objective  see outpatient daily record    AROM 0-125 R/L knee,   MMT:  hip abd R 4/5, initially 4 minus/5             Hip ext 4/5,   initially 4 minus/5             HS 5/5,  initially 5/5             Quad 4+/5, Initially 4/5             IR 5/5,  Initially 4/5             ER 4/5 now and initially.            Assessment   Patient has continued to have no pain at knee and continuing to function very well.  She continues with the HEP given.  Goals met    Goals (to be met in 12 visits)     Therapy Goals        Patient to be independent with HPE  Patient to have equal ROM R to L knee  Patient to be able to walk with decrease lateral lean to R  Patient to be able to go up/down stairs with minimal pain R knee.   Patient to use walker for next few weeks and decrease stairs to reduce strain at R knee.  Patient to report 90% improvement from initial session.                        Plan   Discharge to independent HEP    Treatment Last 4 Visits  Treatment Day: 12       3/14/2025 3/25/2025 4/3/2025 4/29/2025   LE Treatment   Therapeutic Exercise Nu step level 8 for 7 min  Heel slides on board x 15 with use of strap  SLR supine 2 x 10 2 1/2# just below the knee  QS x 15 , hold 2 seconds  Side glut med 2 x 12 RTB  SAQ 2 x 15  2 1/2#  HS curls with RTB 2 x 12 - seated  Standing heel raises 1 x 10  Step up forward x 10 on 8\" step - use of hand rails  Step up side x 10 , 6\" step with hand rails.  March on air ex x 30 sec- minimal use of  support bar  Cone touches on air ex (2) cones for 5 rounds- use of support bar  Tandem stands x 15 sec R/L behind- on air ex- 2 rounds  Wobble board a/p, s/s x 10 min use of support bar a/p, s/s able to do some without support bar    Slant board stretch 45 sec  Step downs 3\" x 10 with support bar  Hip abd x 15 each on air ex with YTB  Hip extension x 12 each with YTB   SLS floor with cone touches- x 2, 5 rounds without support bar Nu step level 10 for 7 min  Heel slides on board x 15 with use of strap  SLR supine 2 x 10 2 1/2# just below the knee  QS x 15 , hold 2 seconds  Side glut med 2 x 12 RTB  SAQ 2 x 15  2 1/2#  HS curls with RTB 2 x 12 - seated  Standing heel raises 1 x 10  Step up forward x 10 on 8\" step - use of hand rails  Step up side x 10 , 6\" step with hand rails.  March on air ex x 30 sec- minimal use of support bar  Cone touches on air ex (3) cones for 5 rounds- use of support bar  Tandem stands x 15/30 sec R/L behind- on air ex- 2 rounds  Wobble board a/p, s/s x 10 min use of support bar a/p, s/s no support bar    Slant board stretch 45 sec  Step downs 4\" x 10 with support bar  Hip abd x 15 each with YTB  Hip extension x 12 each with YTB   SLS floor with cone touches- x 2, 5 rounds without support bar Nu step level 10 for 7 min  Heel slides on board x 15 with use of strap  SLR supine 2 x 10 2 1/2# just below the knee  QS x 15 , hold 2 seconds  Side glut med 2 x 12 RTB  SAQ 2 x 15  2 1/2#  HS curls with RTB 2 x 12 - seated  Standing heel raises 1 x 10  Step up forward x 10 on 6\" step - use of hand rails  Step up side x 10 , 6\" step with hand rails.  March on air ex x 30 sec- minimal use of support bar  Cone touches on air ex (3) cones for 5 rounds- use of support bar  Tandem stands x 15/30 sec R/L behind- on air ex- 2 rounds  Wobble board a/p, s/s x 10 min use of support bar a/p, s/s no support bar    Slant board stretch 45 sec  Step downs 4\" x 10 with support bar  Hip abd x 15 each with HONG  Hip  extension x 12 each with YTB   SLS floor with cone touches- x 2, 5 rounds without support bar Nu step level 10 for 7 min  Heel slides on board x 15 with use of strap  SLR supine 2 x 10 2 # just below the knee  QS x 15 , hold 2 seconds  Side glut med 2 x 12 RTB  SAQ 2 x 15  #  HS curls with RTB 2 x 10 - seated  Standing heel raises 1 x 12  Step up forward x 10 on 6\" step - use of hand rails  Step up side x 10 , 6\" step with hand rails.  March on air ex x 30 sec- minimal use of support bar  Cone touches on air ex (3) cones for 5 rounds- use of support bar  Tandem stands x 15/30 sec R/L behind- on air ex- 2 rounds  Wobble board a/p, s/s x 10 min use of support bar a/p, s/s no support bar  Slant board stretch 45 sec  Step downs 3\" x 10 with support bar  Hip abd x 15 each with YTB  Hip extension x 10 each with YTB   SLS x 10 seconds each leg   Therapeutic Exercise Minutes 40 40 42 40   Total Time Of Timed Procedures 40 40 42 40   Total Time Of Service-Based Procedures 0 0 0 0   Total Treatment Time 40 40 42 40        HEP   Independent with HEP    Charges   Ex 3

## 2025-06-09 ENCOUNTER — OFFICE VISIT (OUTPATIENT)
Dept: INTERNAL MEDICINE CLINIC | Facility: CLINIC | Age: 65
End: 2025-06-09
Payer: COMMERCIAL

## 2025-06-09 VITALS
SYSTOLIC BLOOD PRESSURE: 120 MMHG | WEIGHT: 293 LBS | HEIGHT: 64 IN | BODY MASS INDEX: 50.02 KG/M2 | OXYGEN SATURATION: 97 % | DIASTOLIC BLOOD PRESSURE: 88 MMHG | HEART RATE: 92 BPM

## 2025-06-09 DIAGNOSIS — R31.29 MICROSCOPIC HEMATURIA: ICD-10-CM

## 2025-06-09 DIAGNOSIS — E66.01 OBESITY, MORBID (HCC): ICD-10-CM

## 2025-06-09 DIAGNOSIS — R14.0 ABDOMINAL DISTENTION: ICD-10-CM

## 2025-06-09 DIAGNOSIS — M25.561 RIGHT KNEE PAIN, UNSPECIFIED CHRONICITY: ICD-10-CM

## 2025-06-09 DIAGNOSIS — E55.9 VITAMIN D DEFICIENCY: ICD-10-CM

## 2025-06-09 DIAGNOSIS — Z12.11 SCREENING FOR COLON CANCER: ICD-10-CM

## 2025-06-09 DIAGNOSIS — Z00.00 ANNUAL PHYSICAL EXAM: Primary | ICD-10-CM

## 2025-06-09 DIAGNOSIS — Z12.31 ENCOUNTER FOR SCREENING MAMMOGRAM FOR MALIGNANT NEOPLASM OF BREAST: ICD-10-CM

## 2025-06-09 DIAGNOSIS — E04.1 THYROID NODULE: ICD-10-CM

## 2025-06-09 NOTE — PROGRESS NOTES
Charmaine Bishop is a 65 year old female.    Chief complaint: annual physical exam       HPI:     Charmaine Bishop is a 65 year old female who presents for annual physical exam    Right knee pain   Did complete PT   Seeing ortho NP         No chest pain no sob no abdominal pain  No diarrhea or constipation   No fever or chills   No urinary complaints        Didn't complete the tests as ordered last visit    Skin mole   Getting larger              No smoking   Never been a smoker   Alcohol social   Exercise :no       Family history of cancer: dad lung cancer : smoker   Brother had head and neck cancer         Current Medications[1]   Past Medical History[2]  Past Surgical History[3]        Family History[4]  Problem List[5]    REVIEW OF SYSTEMS:   A comprehensive 10 point review of systems was completed.  Pertinent positives and negatives noted in the the HPI            EXAM:   /88   Pulse 92   Ht 5' 4\" (1.626 m)   Wt (!) 310 lb 3.2 oz (140.7 kg)   SpO2 97%   BMI 53.25 kg/m²   GENERAL: well developed, well nourished,in no apparent distress  SKIN: skin mole with scaly surface on left thigh   HEENT: atraumatic, normocephalic,ears and throat are clear  NECK: supple,no adenopathy  LUNGS: clear to auscultation  CARDIO: RRR without murmur  GI: abdominal distention, possible enlarged liver   No acute abdominal signs   EXTREMITIES: no cyanosis, clubbing or edema  NEURO: no gross deficits              No orders of the defined types were placed in this encounter.    No results found.         ASSESSMENT AND PLAN:     1. Screening for colon cancer  Recommended colonoscopy , she would like to start with cologuard   - [facility] IVAN 2D+3D SCREENING BILAT (CPT=77067/03200); Future  - COLOGUARD COLON CANCER SCREENING (EXTERNAL)  - OBG Referral - In Network  - Zoster Recombinant Adjuvanted (Shingrix -Shingles) [[id number]]  - CBC With Differential With Platelet  - Comp Metabolic Panel (14)  - Lipid Panel  - Hemoglobin A1C  - TSH W  Reflex To Free T4  - Vitamin D; Future  - Urinalysis with Culture Reflex [E]  - US THYROID (CPT=76536); Future  - Sed Rate, Westergren (Automated)  - C-Reactive Protein [E]  - Lipase [E]  - Derm Referral - In Network    2. Annual physical exam  Diet and exercise   Self breast exam   Sun screen recommended   Fasting blood work   Mammogram screening   Cologaurd   Shingles vaccine today   Recommended sleep study : she would like to hold off     - [facility] IVAN 2D+3D SCREENING BILAT (CPT=77067/48476); Future  - COLOGUARD COLON CANCER SCREENING (EXTERNAL)  - OBG Referral - In Network  - Zoster Recombinant Adjuvanted (Shingrix -Shingles) [[id number]]  - CBC With Differential With Platelet  - Comp Metabolic Panel (14)  - Lipid Panel  - Hemoglobin A1C  - TSH W Reflex To Free T4  - Vitamin D; Future  - Urinalysis with Culture Reflex [E]  - US THYROID (CPT=76536); Future  - Sed Rate, Westergren (Automated)  - C-Reactive Protein [E]  - Lipase [E]  - Derm Referral - In Network    3. Encounter for screening mammogram for malignant neoplasm of breast  Mammogram screening   Discussed the importance   - [facility] IVAN 2D+3D SCREENING BILAT (CPT=77067/74178); Future  - COLOGUARD COLON CANCER SCREENING (EXTERNAL)  - OBG Referral - In Network  - Zoster Recombinant Adjuvanted (Shingrix -Shingles) [[id number]]  - CBC With Differential With Platelet  - Comp Metabolic Panel (14)  - Lipid Panel  - Hemoglobin A1C  - TSH W Reflex To Free T4  - Vitamin D; Future  - Urinalysis with Culture Reflex [E]  - US THYROID (CPT=76536); Future  - Sed Rate, Westergren (Automated)  - C-Reactive Protein [E]  - Lipase [E]  - Derm Referral - In Network    4. Vitamin D deficiency  Vitamin d   - [facility] IVAN 2D+3D SCREENING BILAT (CPT=77067/25964); Future  - COLOGUARD COLON CANCER SCREENING (EXTERNAL)  - OBG Referral - In Network  - Zoster Recombinant Adjuvanted (Shingrix -Shingles) [[id number]]  - CBC With Differential With Platelet  - Comp Metabolic Panel (14)  - Lipid Panel  -  Hemoglobin A1C  - TSH W Reflex To Free T4  - Vitamin D; Future  - Urinalysis with Culture Reflex [E]  - US THYROID (CPT=76536); Future  - Sed Rate, Westergren (Automated)  - C-Reactive Protein [E]  - Lipase [E]  - Derm Referral - In Network    5. Obesity, morbid (HCC)  Can schedule weight loss visit if interested   Would benefit from weight loss meds   - [facility] IVAN 2D+3D SCREENING BILAT (CPT=77067/05448); Future  - COLOGUARD COLON CANCER SCREENING (EXTERNAL)  - OBG Referral - In Network  - Zoster Recombinant Adjuvanted (Shingrix -Shingles) [[id number]]  - CBC With Differential With Platelet  - Comp Metabolic Panel (14)  - Lipid Panel  - Hemoglobin A1C  - TSH W Reflex To Free T4  - Vitamin D; Future  - Urinalysis with Culture Reflex [E]  - US THYROID (CPT=76536); Future  - Sed Rate, Westergren (Automated)  - C-Reactive Protein [E]  - Lipase [E]  - Derm Referral - In Network    6. Thyroid nodule  US Thyroid   - [facility] IVAN 2D+3D SCREENING BILAT (CPT=77067/33860); Future  - COLOGUARD COLON CANCER SCREENING (EXTERNAL)  - OBG Referral - In Network  - Zoster Recombinant Adjuvanted (Shingrix -Shingles) [[id number]]  - CBC With Differential With Platelet  - Comp Metabolic Panel (14)  - Lipid Panel  - Hemoglobin A1C  - TSH W Reflex To Free T4  - Vitamin D; Future  - Urinalysis with Culture Reflex [E]  - US THYROID (CPT=76536); Future  - Sed Rate, Westergren (Automated)  - C-Reactive Protein [E]  - Lipase [E]  - Derm Referral - In Network    7. Microscopic hematuria  UA   - [facility] IVAN 2D+3D SCREENING BILAT (CPT=77067/92341); Future  - COLOGUARD COLON CANCER SCREENING (EXTERNAL)  - OBG Referral - In Network  - Zoster Recombinant Adjuvanted (Shingrix -Shingles) [[id number]]  - CBC With Differential With Platelet  - Comp Metabolic Panel (14)  - Lipid Panel  - Hemoglobin A1C  - TSH W Reflex To Free T4  - Vitamin D; Future  - Urinalysis with Culture Reflex [E]  - US THYROID (CPT=76536); Future  - Sed Rate, Westergren (Automated)  - C-Reactive Protein  [E]  - Lipase [E]  - Derm Referral - In Network    8. Right knee pain, unspecified chronicity  Follow up with ortho   Weight loss   - [facility] IVAN 2D+3D SCREENING BILAT (CPT=77067/73121); Future  - COLOGUARD COLON CANCER SCREENING (EXTERNAL)  - OBG Referral - In Network  - Zoster Recombinant Adjuvanted (Shingrix -Shingles) [[id number]]  - CBC With Differential With Platelet  - Comp Metabolic Panel (14)  - Lipid Panel  - Hemoglobin A1C  - TSH W Reflex To Free T4  - Vitamin D; Future  - Urinalysis with Culture Reflex [E]  - US THYROID (CPT=76536); Future  - Sed Rate, Westergren (Automated)  - C-Reactive Protein [E]  - Lipase [E]  - Derm Referral - In Network    9. Abdominal distention E/M  Advised to have blood work cbc , cmp, esr , crp, lipase  Imaging ( us vs C scan ) based on the results        10-Skin lesion : referral to dermatology       Please return to the clinic if you are having persistent symptoms. If worsening symptoms should go to the ER    Bobby Dickens MD,   Diplomate of the American Board of Internal Medicine  Diplomate of the American Board of Obesity Medicine          [1]   Current Outpatient Medications   Medication Sig Dispense Refill    ALPRAZolam (XANAX) 0.5 MG Oral Tab Take 1 tablet (0.5 mg total) by mouth 3 (three) times daily as needed. 90 tablet 1   [2]   Past Medical History:   ANXIETY    Cataract    Cyst of pineal gland    Hiatal hernia    small; noted on CT urogram    HYPERLIPIDEMIA    Morbid obesity with BMI of 45.0-49.9, adult (HCC)    Posterior vitreous detachment    Screening for osteoporosis    normal    Thyroid nodule    Vitamin D deficiency   [3]   Past Surgical History:  Procedure Laterality Date    Cataract      Cyst aspiration right  2013    Other surgical history      wisdom teeth    Other surgical history  2012    FNA bx of thyroid nodule (per Dr. Ellis)    Other surgical history  03/19/2018    Cysto-  Oh   [4]   Family History  Problem Relation Age of Onset    Cancer Father          lung    Diabetes Father     Heart Disorder Father     Other (Other) Father     Cancer Brother         tongue    Other (alcoholic) Brother     Heart Disorder Paternal Grandfather     Eye Problems Neg    [5]   Patient Active Problem List  Diagnosis    Anxiety    Vitamin D deficiency    Pure hypercholesterolemia    Thyroid nodule    PVD (posterior vitreous detachment), left    Microscopic hematuria    Obesity, morbid (HCC)    Screening for heart disease

## 2025-06-11 DIAGNOSIS — E55.9 VITAMIN D DEFICIENCY: ICD-10-CM

## 2025-06-11 DIAGNOSIS — Z12.31 ENCOUNTER FOR SCREENING MAMMOGRAM FOR MALIGNANT NEOPLASM OF BREAST: ICD-10-CM

## 2025-06-11 DIAGNOSIS — Z12.11 SCREENING FOR COLON CANCER: ICD-10-CM

## 2025-06-11 DIAGNOSIS — Z00.00 ANNUAL PHYSICAL EXAM: ICD-10-CM

## 2025-06-12 NOTE — TELEPHONE ENCOUNTER
Please review; protocol failed/ has no protocol      Recent fills: 04/24/2025  Last Rx written: 11/11/2024  Last Office Visit: 06/09/2025    Recent Visits  Date Type Provider Dept   06/09/25 Office Visit Bobby Dickens MD [facility] [address] [place] [address]

## 2025-06-13 RX ORDER — ALPRAZOLAM 0.5 MG
0.5 TABLET ORAL 3 TIMES DAILY PRN
Qty: 90 TABLET | Refills: 1 | Status: SHIPPED | OUTPATIENT
Start: 2025-06-13

## 2025-07-02 ENCOUNTER — TELEPHONE (OUTPATIENT)
Dept: ORTHOPEDICS CLINIC | Facility: CLINIC | Age: 65
End: 2025-07-02

## 2025-07-02 DIAGNOSIS — M17.11 PRIMARY OSTEOARTHRITIS OF RIGHT KNEE: Primary | ICD-10-CM

## 2025-07-02 NOTE — TELEPHONE ENCOUNTER
Patient called and notified. She has an appointment scheduled.     Future Appointments   Date Time Provider Department Center   8/1/2025  9:00 AM Criss Peace PA-C [facility]   8/1/2025  2:00 PM [facility]

## 2025-07-02 NOTE — TELEPHONE ENCOUNTER
Criss Peace PA-C sent to [facility] Clinical Pool  Can someone please reach out to this patient and let her know that I just placed a new authorization for another Durolane gel injection? Once authorized, she can make an injection appt with me anytime after 7/31 when eligible. Thank you

## 2025-07-09 ENCOUNTER — TELEPHONE (OUTPATIENT)
Dept: ORTHOPEDICS CLINIC | Facility: CLINIC | Age: 65
End: 2025-07-09

## 2025-07-19 NOTE — TELEPHONE ENCOUNTER
Patient authorized visco to be scheduled:    DOS:8/1/25  PROVIDER: Criss  MEDICATION: Durolane  OFFICE LOCATION: [place]  PULLED:no  LABELED:no  PLACED:no

## 2025-08-01 ENCOUNTER — HOSPITAL ENCOUNTER (OUTPATIENT)
Dept: ULTRASOUND IMAGING | Age: 65
Discharge: HOME | End: 2025-08-01
Attending: INTERNAL MEDICINE

## 2025-08-01 ENCOUNTER — OFFICE VISIT (OUTPATIENT)
Dept: ORTHOPEDICS CLINIC | Facility: CLINIC | Age: 65
End: 2025-08-01

## 2025-08-01 VITALS — WEIGHT: 293 LBS | HEIGHT: 64 IN | BODY MASS INDEX: 50.02 KG/M2

## 2025-08-01 DIAGNOSIS — E04.1 THYROID NODULE: ICD-10-CM

## 2025-08-01 DIAGNOSIS — Z12.11 SCREENING FOR COLON CANCER: ICD-10-CM

## 2025-08-01 DIAGNOSIS — M17.11 PRIMARY OSTEOARTHRITIS OF RIGHT KNEE: Primary | ICD-10-CM

## 2025-08-01 DIAGNOSIS — M25.561 RIGHT KNEE PAIN, UNSPECIFIED CHRONICITY: ICD-10-CM

## 2025-08-01 DIAGNOSIS — E66.01 OBESITY, MORBID (HCC): ICD-10-CM

## 2025-08-01 DIAGNOSIS — R31.29 MICROSCOPIC HEMATURIA: ICD-10-CM

## 2025-08-01 DIAGNOSIS — Z12.31 ENCOUNTER FOR SCREENING MAMMOGRAM FOR MALIGNANT NEOPLASM OF BREAST: ICD-10-CM

## 2025-08-01 DIAGNOSIS — Z00.00 ANNUAL PHYSICAL EXAM: ICD-10-CM

## 2025-08-01 DIAGNOSIS — E55.9 VITAMIN D DEFICIENCY: ICD-10-CM

## 2025-08-01 PROCEDURE — 76536 US EXAM OF HEAD AND NECK: CPT | Performed by: INTERNAL MEDICINE

## 2025-08-01 PROCEDURE — 20610 DRAIN/INJ JOINT/BURSA W/O US: CPT

## 2025-08-28 ENCOUNTER — LAB ENCOUNTER (OUTPATIENT)
Dept: LAB | Age: 65
End: 2025-08-28
Attending: INTERNAL MEDICINE

## 2025-08-28 DIAGNOSIS — E04.1 THYROID NODULE: ICD-10-CM

## 2025-08-28 DIAGNOSIS — M25.561 RIGHT KNEE PAIN, UNSPECIFIED CHRONICITY: ICD-10-CM

## 2025-08-28 DIAGNOSIS — Z12.31 ENCOUNTER FOR SCREENING MAMMOGRAM FOR MALIGNANT NEOPLASM OF BREAST: ICD-10-CM

## 2025-08-28 DIAGNOSIS — Z12.11 SCREENING FOR COLON CANCER: ICD-10-CM

## 2025-08-28 DIAGNOSIS — R31.29 MICROSCOPIC HEMATURIA: ICD-10-CM

## 2025-08-28 DIAGNOSIS — Z00.00 ANNUAL PHYSICAL EXAM: ICD-10-CM

## 2025-08-28 DIAGNOSIS — E66.01 OBESITY, MORBID (HCC): ICD-10-CM

## 2025-08-28 DIAGNOSIS — E55.9 VITAMIN D DEFICIENCY: ICD-10-CM

## 2025-08-28 LAB
ALBUMIN SERPL-MCNC: 4.4 G/DL (ref 3.2–4.8)
ALBUMIN/GLOB SERPL: 1.9 (ref 1–2)
ALP LIVER SERPL-CCNC: 100 U/L (ref 50–130)
ALT SERPL-CCNC: 20 U/L (ref 10–49)
ANION GAP SERPL CALC-SCNC: 7 MMOL/L (ref 0–18)
AST SERPL-CCNC: 21 U/L (ref ?–34)
BASOPHILS # BLD AUTO: 0.03 X10(3) UL (ref 0–0.2)
BASOPHILS NFR BLD AUTO: 0.6 %
BILIRUB SERPL-MCNC: 0.4 MG/DL (ref 0.2–1.1)
BILIRUB UR QL: NEGATIVE
BUN BLD-MCNC: 21 MG/DL (ref 9–23)
BUN/CREAT SERPL: 23.1 (ref 10–20)
CALCIUM BLD-MCNC: 9.3 MG/DL (ref 8.7–10.4)
CHLORIDE SERPL-SCNC: 107 MMOL/L (ref 98–112)
CHOLEST SERPL-MCNC: 211 MG/DL (ref ?–200)
CO2 SERPL-SCNC: 27 MMOL/L (ref 21–32)
CREAT BLD-MCNC: 0.91 MG/DL (ref 0.55–1.02)
CRP SERPL-MCNC: <0.5 MG/DL (ref ?–0.5)
DEPRECATED RDW RBC AUTO: 44.4 FL (ref 35.1–46.3)
EGFRCR SERPLBLD CKD-EPI 2021: 71 ML/MIN/1.73M2 (ref 60–?)
EOSINOPHIL # BLD AUTO: 0.14 X10(3) UL (ref 0–0.7)
EOSINOPHIL NFR BLD AUTO: 2.7 %
ERYTHROCYTE [DISTWIDTH] IN BLOOD BY AUTOMATED COUNT: 13.3 % (ref 11–15)
ERYTHROCYTE [SEDIMENTATION RATE] IN BLOOD: 16 MM/HR (ref 0–30)
EST. AVERAGE GLUCOSE BLD GHB EST-MCNC: 108 MG/DL (ref 68–126)
FASTING PATIENT LIPID ANSWER: YES
FASTING STATUS PATIENT QL REPORTED: YES
GLOBULIN PLAS-MCNC: 2.3 G/DL (ref 2–3.5)
GLUCOSE BLD-MCNC: 99 MG/DL (ref 70–99)
GLUCOSE UR-MCNC: NORMAL MG/DL
HBA1C MFR BLD: 5.4 % (ref ?–5.7)
HCT VFR BLD AUTO: 40.8 % (ref 35–48)
HDLC SERPL-MCNC: 57 MG/DL (ref 40–59)
HGB BLD-MCNC: 13.6 G/DL (ref 12–16)
IMM GRANULOCYTES # BLD AUTO: 0 X10(3) UL (ref 0–1)
IMM GRANULOCYTES NFR BLD: 0 %
KETONES UR-MCNC: NEGATIVE MG/DL
LDLC SERPL CALC-MCNC: 134 MG/DL (ref ?–100)
LEUKOCYTE ESTERASE UR QL STRIP.AUTO: 500
LIPASE SERPL-CCNC: 37 U/L (ref 12–53)
LYMPHOCYTES # BLD AUTO: 1.21 X10(3) UL (ref 1–4)
LYMPHOCYTES NFR BLD AUTO: 22.9 %
MCH RBC QN AUTO: 30 PG (ref 26–34)
MCHC RBC AUTO-ENTMCNC: 33.3 G/DL (ref 31–37)
MCV RBC AUTO: 90.1 FL (ref 80–100)
MONOCYTES # BLD AUTO: 0.38 X10(3) UL (ref 0.1–1)
MONOCYTES NFR BLD AUTO: 7.2 %
NEUTROPHILS # BLD AUTO: 3.52 X10 (3) UL (ref 1.5–7.7)
NEUTROPHILS # BLD AUTO: 3.52 X10(3) UL (ref 1.5–7.7)
NEUTROPHILS NFR BLD AUTO: 66.6 %
NITRITE UR QL STRIP.AUTO: NEGATIVE
NONHDLC SERPL-MCNC: 154 MG/DL (ref ?–130)
OSMOLALITY SERPL CALC.SUM OF ELEC: 295 MOSM/KG (ref 275–295)
PH UR: 5.5 (ref 5–8)
PLATELET # BLD AUTO: 252 10(3)UL (ref 150–450)
POTASSIUM SERPL-SCNC: 4.5 MMOL/L (ref 3.5–5.1)
PROT SERPL-MCNC: 6.7 G/DL (ref 5.7–8.2)
PROT UR-MCNC: 30 MG/DL
RBC # BLD AUTO: 4.53 X10(6)UL (ref 3.8–5.3)
RBC #/AREA URNS AUTO: >10 /HPF
SODIUM SERPL-SCNC: 141 MMOL/L (ref 136–145)
SP GR UR STRIP: 1.03 (ref 1–1.03)
TRIGL SERPL-MCNC: 114 MG/DL (ref 30–149)
TSI SER-ACNC: 3.44 UIU/ML (ref 0.55–4.78)
UROBILINOGEN UR STRIP-ACNC: NORMAL
VIT D+METAB SERPL-MCNC: 9 NG/ML (ref 30–100)
VLDLC SERPL CALC-MCNC: 21 MG/DL (ref 0–30)
WBC # BLD AUTO: 5.3 X10(3) UL (ref 4–11)
WBC #/AREA URNS AUTO: >50 /HPF
WBC CLUMPS UR QL AUTO: PRESENT /HPF

## 2025-08-28 PROCEDURE — 82306 VITAMIN D 25 HYDROXY: CPT

## 2025-08-28 PROCEDURE — 83690 ASSAY OF LIPASE: CPT | Performed by: INTERNAL MEDICINE

## 2025-08-28 PROCEDURE — 83036 HEMOGLOBIN GLYCOSYLATED A1C: CPT | Performed by: INTERNAL MEDICINE

## 2025-08-28 PROCEDURE — 81001 URINALYSIS AUTO W/SCOPE: CPT | Performed by: INTERNAL MEDICINE

## 2025-08-28 PROCEDURE — 86140 C-REACTIVE PROTEIN: CPT | Performed by: INTERNAL MEDICINE

## 2025-08-28 PROCEDURE — 85025 COMPLETE CBC W/AUTO DIFF WBC: CPT | Performed by: INTERNAL MEDICINE

## 2025-08-28 PROCEDURE — 36415 COLL VENOUS BLD VENIPUNCTURE: CPT | Performed by: INTERNAL MEDICINE

## 2025-08-28 PROCEDURE — 84443 ASSAY THYROID STIM HORMONE: CPT | Performed by: INTERNAL MEDICINE

## 2025-08-28 PROCEDURE — 80061 LIPID PANEL: CPT | Performed by: INTERNAL MEDICINE

## 2025-08-28 PROCEDURE — 87086 URINE CULTURE/COLONY COUNT: CPT | Performed by: INTERNAL MEDICINE

## 2025-08-28 PROCEDURE — 80053 COMPREHEN METABOLIC PANEL: CPT | Performed by: INTERNAL MEDICINE

## 2025-08-28 PROCEDURE — 85652 RBC SED RATE AUTOMATED: CPT | Performed by: INTERNAL MEDICINE

## 2025-08-29 RX ORDER — ERGOCALCIFEROL 1.25 MG/1
50000 CAPSULE, LIQUID FILLED ORAL WEEKLY
Qty: 12 CAPSULE | Refills: 1 | Status: SHIPPED | OUTPATIENT
Start: 2025-08-29 | End: 2025-11-15